# Patient Record
Sex: MALE | NOT HISPANIC OR LATINO | Employment: FULL TIME | URBAN - METROPOLITAN AREA
[De-identification: names, ages, dates, MRNs, and addresses within clinical notes are randomized per-mention and may not be internally consistent; named-entity substitution may affect disease eponyms.]

---

## 2018-11-06 ENCOUNTER — OFFICE VISIT CONVERTED (OUTPATIENT)
Dept: FAMILY MEDICINE CLINIC | Facility: CLINIC | Age: 53
End: 2018-11-06
Attending: FAMILY MEDICINE

## 2019-02-22 ENCOUNTER — OFFICE VISIT CONVERTED (OUTPATIENT)
Dept: FAMILY MEDICINE CLINIC | Facility: CLINIC | Age: 54
End: 2019-02-22
Attending: NURSE PRACTITIONER

## 2019-02-22 ENCOUNTER — CONVERSION ENCOUNTER (OUTPATIENT)
Dept: FAMILY MEDICINE CLINIC | Facility: CLINIC | Age: 54
End: 2019-02-22

## 2020-01-23 ENCOUNTER — OFFICE VISIT CONVERTED (OUTPATIENT)
Dept: FAMILY MEDICINE CLINIC | Facility: CLINIC | Age: 55
End: 2020-01-23
Attending: FAMILY MEDICINE

## 2020-01-24 ENCOUNTER — HOSPITAL ENCOUNTER (OUTPATIENT)
Dept: FAMILY MEDICINE CLINIC | Facility: CLINIC | Age: 55
Discharge: HOME | End: 2020-01-24
Attending: FAMILY MEDICINE

## 2020-01-24 LAB
ALBUMIN SERPL-MCNC: 4.2 G/DL (ref 3.5–5)
ALBUMIN/GLOB SERPL: 1.4 {RATIO} (ref 1.4–2.6)
ALP SERPL-CCNC: 63 U/L (ref 56–119)
ALT SERPL-CCNC: 11 U/L (ref 10–40)
ANION GAP SERPL CALC-SCNC: 24 MMOL/L (ref 8–19)
AST SERPL-CCNC: 16 U/L (ref 15–50)
BASOPHILS # BLD AUTO: 0.02 10*3/UL (ref 0–0.2)
BASOPHILS NFR BLD AUTO: 0.2 % (ref 0–3)
BILIRUB SERPL-MCNC: 0.5 MG/DL (ref 0.2–1.3)
BUN SERPL-MCNC: 14 MG/DL (ref 5–25)
BUN/CREAT SERPL: 13 {RATIO} (ref 6–20)
CALCIUM SERPL-MCNC: 9.9 MG/DL (ref 8.7–10.4)
CHLORIDE SERPL-SCNC: 105 MMOL/L (ref 99–111)
CHOLEST SERPL-MCNC: 168 MG/DL (ref 107–200)
CHOLEST/HDLC SERPL: 3.4 {RATIO} (ref 3–6)
CONV ABS IMM GRAN: 0.07 10*3/UL (ref 0–0.2)
CONV CO2: 19 MMOL/L (ref 22–32)
CONV IMMATURE GRAN: 0.6 % (ref 0–1.8)
CONV TOTAL PROTEIN: 7.3 G/DL (ref 6.3–8.2)
CREAT UR-MCNC: 1.09 MG/DL (ref 0.7–1.2)
DEPRECATED RDW RBC AUTO: 47.4 FL (ref 35.1–43.9)
EOSINOPHIL # BLD AUTO: 0 % (ref 0–7)
EOSINOPHIL # BLD AUTO: 0 10*3/UL (ref 0–0.7)
ERYTHROCYTE [DISTWIDTH] IN BLOOD BY AUTOMATED COUNT: 13.3 % (ref 11.6–14.4)
FOLATE SERPL-MCNC: 12.9 NG/ML (ref 4.8–20)
GFR SERPLBLD BASED ON 1.73 SQ M-ARVRAT: >60 ML/MIN/{1.73_M2}
GLOBULIN UR ELPH-MCNC: 3.1 G/DL (ref 2–3.5)
GLUCOSE SERPL-MCNC: 135 MG/DL (ref 70–99)
HCT VFR BLD AUTO: 45 % (ref 42–52)
HDLC SERPL-MCNC: 49 MG/DL (ref 40–60)
HGB BLD-MCNC: 14.6 G/DL (ref 14–18)
LDLC SERPL CALC-MCNC: 107 MG/DL (ref 70–100)
LYMPHOCYTES # BLD AUTO: 1.56 10*3/UL (ref 1–5)
LYMPHOCYTES NFR BLD AUTO: 12.3 % (ref 20–45)
MCH RBC QN AUTO: 31.4 PG (ref 27–31)
MCHC RBC AUTO-ENTMCNC: 32.4 G/DL (ref 33–37)
MCV RBC AUTO: 96.8 FL (ref 80–96)
MONOCYTES # BLD AUTO: 0.62 10*3/UL (ref 0.2–1.2)
MONOCYTES NFR BLD AUTO: 4.9 % (ref 3–10)
NEUTROPHILS # BLD AUTO: 10.39 10*3/UL (ref 2–8)
NEUTROPHILS NFR BLD AUTO: 82 % (ref 30–85)
NRBC CBCN: 0 % (ref 0–0.7)
OSMOLALITY SERPL CALC.SUM OF ELEC: 299 MOSM/KG (ref 273–304)
PLATELET # BLD AUTO: 251 10*3/UL (ref 130–400)
PMV BLD AUTO: 10.8 FL (ref 9.4–12.4)
POTASSIUM SERPL-SCNC: 4.8 MMOL/L (ref 3.5–5.3)
PSA SERPL-MCNC: 1.4 NG/ML (ref 0–4)
RBC # BLD AUTO: 4.65 10*6/UL (ref 4.7–6.1)
SODIUM SERPL-SCNC: 143 MMOL/L (ref 135–147)
TRIGL SERPL-MCNC: 61 MG/DL (ref 40–150)
VIT B12 SERPL-MCNC: 406 PG/ML (ref 211–911)
VLDLC SERPL-MCNC: 12 MG/DL (ref 5–37)
WBC # BLD AUTO: 12.66 10*3/UL (ref 4.8–10.8)

## 2020-02-11 ENCOUNTER — OFFICE VISIT CONVERTED (OUTPATIENT)
Dept: FAMILY MEDICINE CLINIC | Facility: CLINIC | Age: 55
End: 2020-02-11
Attending: FAMILY MEDICINE

## 2020-02-11 ENCOUNTER — HOSPITAL ENCOUNTER (OUTPATIENT)
Dept: FAMILY MEDICINE CLINIC | Facility: CLINIC | Age: 55
Discharge: HOME | End: 2020-02-11
Attending: FAMILY MEDICINE

## 2020-02-11 LAB
BASOPHILS # BLD AUTO: 0.08 10*3/UL (ref 0–0.2)
BASOPHILS NFR BLD AUTO: 0.9 % (ref 0–3)
CONV ABS IMM GRAN: 0.03 10*3/UL (ref 0–0.2)
CONV IMMATURE GRAN: 0.4 % (ref 0–1.8)
DEPRECATED RDW RBC AUTO: 48.3 FL (ref 35.1–43.9)
EOSINOPHIL # BLD AUTO: 0.15 10*3/UL (ref 0–0.7)
EOSINOPHIL # BLD AUTO: 1.8 % (ref 0–7)
ERYTHROCYTE [DISTWIDTH] IN BLOOD BY AUTOMATED COUNT: 13.5 % (ref 11.6–14.4)
EST. AVERAGE GLUCOSE BLD GHB EST-MCNC: 111 MG/DL
HBA1C MFR BLD: 5.5 % (ref 3.5–5.7)
HCT VFR BLD AUTO: 42.7 % (ref 42–52)
HGB BLD-MCNC: 13.9 G/DL (ref 14–18)
LYMPHOCYTES # BLD AUTO: 2.55 10*3/UL (ref 1–5)
LYMPHOCYTES NFR BLD AUTO: 30.1 % (ref 20–45)
MCH RBC QN AUTO: 31.4 PG (ref 27–31)
MCHC RBC AUTO-ENTMCNC: 32.6 G/DL (ref 33–37)
MCV RBC AUTO: 96.4 FL (ref 80–96)
MONOCYTES # BLD AUTO: 0.68 10*3/UL (ref 0.2–1.2)
MONOCYTES NFR BLD AUTO: 8 % (ref 3–10)
NEUTROPHILS # BLD AUTO: 4.97 10*3/UL (ref 2–8)
NEUTROPHILS NFR BLD AUTO: 58.8 % (ref 30–85)
NRBC CBCN: 0 % (ref 0–0.7)
PLATELET # BLD AUTO: 230 10*3/UL (ref 130–400)
PMV BLD AUTO: 10.5 FL (ref 9.4–12.4)
RBC # BLD AUTO: 4.43 10*6/UL (ref 4.7–6.1)
WBC # BLD AUTO: 8.46 10*3/UL (ref 4.8–10.8)

## 2020-02-27 ENCOUNTER — HOSPITAL ENCOUNTER (OUTPATIENT)
Dept: OTHER | Facility: HOSPITAL | Age: 55
Discharge: HOME | End: 2020-02-27
Attending: FAMILY MEDICINE

## 2020-02-27 LAB
CREAT BLD-MCNC: 1.1 MG/DL (ref 0.6–1.4)
GFR SERPLBLD BASED ON 1.73 SQ M-ARVRAT: >60 ML/MIN/{1.73_M2}

## 2020-05-27 ENCOUNTER — CONVERSION ENCOUNTER (OUTPATIENT)
Dept: GASTROENTEROLOGY | Facility: CLINIC | Age: 55
End: 2020-05-27
Attending: INTERNAL MEDICINE

## 2020-07-12 LAB — SARS-COV-2 RNA SPEC QL NAA+PROBE: NOT DETECTED

## 2020-07-14 ENCOUNTER — HOSPITAL ENCOUNTER (OUTPATIENT)
Dept: GASTROENTEROLOGY | Facility: HOSPITAL | Age: 55
Setting detail: HOSPITAL OUTPATIENT SURGERY
Discharge: HOME | End: 2020-07-14
Attending: INTERNAL MEDICINE

## 2021-01-27 ENCOUNTER — HOSPITAL ENCOUNTER (OUTPATIENT)
Dept: FAMILY MEDICINE CLINIC | Facility: CLINIC | Age: 56
Discharge: HOME | End: 2021-01-27
Attending: FAMILY MEDICINE

## 2021-01-27 ENCOUNTER — OFFICE VISIT CONVERTED (OUTPATIENT)
Dept: FAMILY MEDICINE CLINIC | Facility: CLINIC | Age: 56
End: 2021-01-27
Attending: FAMILY MEDICINE

## 2021-02-05 ENCOUNTER — HOSPITAL ENCOUNTER (OUTPATIENT)
Dept: OTHER | Facility: HOSPITAL | Age: 56
Discharge: HOME | End: 2021-02-05
Attending: FAMILY MEDICINE

## 2021-02-12 ENCOUNTER — OFFICE VISIT CONVERTED (OUTPATIENT)
Dept: FAMILY MEDICINE CLINIC | Facility: CLINIC | Age: 56
End: 2021-02-12
Attending: FAMILY MEDICINE

## 2021-02-23 ENCOUNTER — OFFICE VISIT CONVERTED (OUTPATIENT)
Dept: CARDIOLOGY | Facility: CLINIC | Age: 56
End: 2021-02-23
Attending: SPECIALIST

## 2021-03-02 ENCOUNTER — HOSPITAL ENCOUNTER (OUTPATIENT)
Dept: CARDIOLOGY | Facility: HOSPITAL | Age: 56
Discharge: HOME | End: 2021-03-02
Attending: SPECIALIST

## 2021-03-02 ENCOUNTER — CONVERSION ENCOUNTER (OUTPATIENT)
Dept: CARDIOLOGY | Facility: CLINIC | Age: 56
End: 2021-03-02
Attending: SPECIALIST

## 2021-03-03 ENCOUNTER — HOSPITAL ENCOUNTER (OUTPATIENT)
Dept: NUCLEAR MEDICINE | Facility: HOSPITAL | Age: 56
Discharge: HOME | End: 2021-03-03
Attending: SPECIALIST

## 2021-03-19 ENCOUNTER — HOSPITAL ENCOUNTER (OUTPATIENT)
Dept: GENERAL RADIOLOGY | Facility: HOSPITAL | Age: 56
Discharge: HOME | End: 2021-03-19
Attending: SPECIALIST

## 2021-04-13 ENCOUNTER — OFFICE VISIT CONVERTED (OUTPATIENT)
Dept: CARDIOLOGY | Facility: CLINIC | Age: 56
End: 2021-04-13
Attending: SPECIALIST

## 2021-04-13 ENCOUNTER — OFFICE VISIT CONVERTED (OUTPATIENT)
Dept: CARDIOLOGY | Facility: CLINIC | Age: 56
End: 2021-04-13
Attending: INTERNAL MEDICINE

## 2021-05-05 ENCOUNTER — HOSPITAL ENCOUNTER (OUTPATIENT)
Dept: PREADMISSION TESTING | Facility: HOSPITAL | Age: 56
Discharge: HOME | End: 2021-05-05
Attending: INTERNAL MEDICINE

## 2021-05-05 LAB — SARS-COV-2 RNA SPEC QL NAA+PROBE: NOT DETECTED

## 2021-05-07 NOTE — PROGRESS NOTES
Progress Note      Patient Name: Gabriele Chau   Patient ID: [id number]   Sex: Male   YOB: 1965    Referring Provider: Carlo Hernandez MD    Visit Date: January 23, 2020    Provider: Carlo Hernandez MD   Location: [facility]   Location Address: [address] [place] [address] [place]  [address]   Location Phone: [phone]          Chief Complaint     Left ear pain and headache x 5 days       History Of Present Illness  Gabriele Chau is a 54 year old /White male who presents for evaluation and treatment of:      pt urged to quit smoking- pt smokes 1 ppd x 22 yrs  pt urged to get colonoscopy  cold symptoms x 5 days- sudden onset- worsening symptoms  HTN- uncontrolled  depression- uncontrolled- no SI or HI- pt off Lexapro- restarted on Lexapro  pt has had numbness in left face only, no other neuro symptoms, no asymmetry of face or facial or eyelid droop       Past Medical History  Disease Name Date Onset Notes   Arthritis --  --    Hypertension, Benign Essential --  --    Seasonal allergies --  --          Past Surgical History  Procedure Name Date Notes   Shoulder surgery --  --          Medication List  Name Date Started Instructions   Lexapro 10 mg oral tablet 01/23/2020 TAKE ONE TABLET BY MOUTH DAILY   lisinopril 20 mg oral tablet 01/23/2020 take 1 tablet (20 mg) by oral route once daily for 30 days         Allergy List  Allergen Name Date Reaction Notes   NO KNOWN DRUG ALLERGIES --  --  --          Family Medical History  Disease Name Relative/Age Notes   Hypertension Father/   Father   Cerebrovascular Accident (CVA) Father/   Father         Social History  Finding Status Start/Stop Quantity Notes   Alcohol Former --/-- --  drank alcohol in the past   Recreational Drug Use Never --/-- --  never used   Second hand smoke exposure Unknown --/-- --  no   Tobacco Current every day --/-- 0.5 PPD current every day smoker, smokes less than 1 pack per day, for 30  "years, in the past used other tobacco products   Uses seatbelts --  --/-- --  yes         Review of Systems  · Constitutional  o Denies  o : fatigue, fever  · HENT  o Admits  o : ear pain  o Denies  o : nasal congestion, nasal discharge, sore throat  · Cardiovascular  o Denies  o : chest pain, palpitations  · Respiratory  o Denies  o : shortness of breath, cough  · Gastrointestinal  o Denies  o : nausea, vomiting, diarrhea  · Psychiatric  o Admits  o : depression  o Denies  o : anxiety, suicidal ideation, homicidal ideation      Vitals  Date Time BP Position Site L\R Cuff Size HR RR TEMP (F) WT  HT  BMI kg/m2 BSA m2 O2 Sat HC       01/23/2020 10:57 /90 Sitting    65 - R  99.1 138lbs 0oz 5'  10.5\" 19.52 1.76 100 %          Physical Examination  · Constitutional  o Appearance  o : well developed, well-nourished, in no acute distress  · Head and Face  o HEENT  o : left TM red, cloudy, left side of face slightly swollen, no redness or warmth, MMM, erythema of throat, uvula midline, throat open, no abscesses seen  · Respiratory  o Respiratory Effort  o : breathing unlabored  o Auscultation of Lungs  o : clear to ascultation  · Cardiovascular  o Heart  o :   § Auscultation of Heart  § : regular rate and rhythm  o Peripheral Vascular System  o :   § Extremities  § : no edema  · Gastrointestinal  o Abdomen  o : soft, non-tender, non-distended, + bowel sounds, no hepatosplenomegaly, no masses palpated  · Musculoskeletal  o General  o :   § General Musculoskeletal  § : No joint swelling or deformity., Muscle tone, strength, and development grossly normal.  · Neurologic  o Gait and Station  o :   § Gait Screening  § : normal gait, neuro exam is normal, face symmetrical, muscle strength 5/5, neuro face normal  · Psychiatric  o Mood and Affect  o : mood normal, affect appropriate          Assessment  · Essential hypertension     401.9/I10  · Screening for colon cancer     V76.51/Z12.11  · Screening PSA (prostate specific " antigen)     V76.44/Z12.5  · Numbness     782.0/R20.0  · Otitis media, left     382.9/H66.92    Problems Reconciled  Plan  · Orders  o CBC with Auto Diff [facility] (89970) - V76.51/Z12.11, 401.9/I10, V76.44/Z12.5 - 01/23/2020  o CMP [facility] (81769) - V76.51/Z12.11, 401.9/I10, V76.44/Z12.5 - 01/23/2020  o Lipid Panel [facility] (89685) - V76.51/Z12.11, 401.9/I10, V76.44/Z12.5 - 01/23/2020  o ACO-13: Fall Risk Screening with no falls in past year or only one fall without injury in the past year (1101F) - - 01/23/2020  o ACO-14: Influenza immunization administered or previously received () - - 01/23/2020  o ACO-17: Screened for tobacco use AND received tobacco cessation intervention (4004F) - - 01/23/2020  o ACO-39: Current medications updated and reviewed () - - 01/23/2020  o Gastroenterology Consultation (GASTR) - V76.51/Z12.11 - 01/23/2020   needs screening colonoscopy- no change in stool  o PSA Ultrasensitive, ANNUAL SCREENING [facility] (07783) - V76.51/Z12.11, 401.9/I10, V76.44/Z12.5 - 01/23/2020  o Fluzone Quadrivalent Vaccine, age 3+ (44136) - - 01/23/2020  o Vitamin B12 and folate measurement (37753, 19575) - 782.0/R20.0 - 01/23/2020  · Medications  o cefdinir 300 mg oral capsule   SIG: take 1 capsule (300 mg) by oral route every 12 hours for 7 days   DISP: (14) capsules with 0 refills  Prescribed on 01/23/2020     o Probiotic 20 billion cell oral capsule   SIG: take 1 capsule by oral route 2 times a day for 10 days   DISP: (20) capsules with 0 refills  Prescribed on 01/23/2020     o prednisone 20 mg oral tablet   SIG: take 2 tablets by oral route daily for 5 days   DISP: (10) tablets with 0 refills  Prescribed on 01/23/2020     o Ciprodex 0.3-0.1 % otic (ear) drops,suspension   SIG: instill 4 drops into left ear by otic route every 12 hours for 7 days   DISP: (1) 7.5 ml drop btl with 0 refills  Prescribed on 01/23/2020     o Lexapro 10 mg oral tablet   SIG: TAKE ONE TABLET BY MOUTH DAILY   DISP: (30) Tablet with 5  refills  Adjusted on 01/23/2020     o lisinopril 20 mg oral tablet   SIG: take 1 tablet (20 mg) by oral route once daily for 30 days   DISP: (30) tablets with 5 refills  Adjusted on 01/23/2020     o Medications have been Reconciled  o Transition of Care or Provider Policy  · Instructions  o Handouts were given to patient: flu.  o Patient was educated/instructed on their diagnosis, treatment and medications prior to discharge from the clinic today.  o Electronically Identified Patient Education Materials Provided Electronically            Electronically Signed by: Carlo Hernandez MD -Author on January 23, 2020 01:26:10 PM

## 2021-05-07 NOTE — PROGRESS NOTES
Progress Note      Patient Name: Gabriele Chau   Patient ID: [id number]   Sex: Male   YOB: 1965    Referring Provider: Ольга Oliveira DO    Visit Date: January 27, 2021    Provider: Ольга Oliveira DO   Location: [facility]   Location Address: [address] [place] [address]   [place]  [address]   Location Phone: [phone]          Chief Complaint     Pain in right hand from tree branch in OCT. Left elbow sore. No known injury. C/o shortness of breath and left chest pain for several months.       History Of Present Illness  Gabriele Chau is a 55 year old /White male who presents for evaluation and treatment of:      1.) RIGHT HAND AND THUMB PAIN : Per patient - trauma to hand via a tree branch in 10/2020. He reports intractable pain of his thumb and thenar eminence. Reports discomfort with extension and adduction of his thumb. Reports difficulty with 'picking things up.'    2.) LEFT ELBOW PAIN : Reports pain of the radial aspect of his left elbow. Per patient - 'I feel a bump.' No injury.     3.) CHEST PAIN : C/o SOA associated with exertion. Onset was several months ago. Reports left sided chest pain. Both sxs are intermittent. He denies associated episodes palpitations, diaphoresis, lightheadedness or nausea.       Past Medical History  Disease Name Date Onset Notes   Arthritis --  --    Hypertension, Benign Essential --  --    Seasonal allergies --  --          Past Surgical History  Procedure Name Date Notes   Shoulder surgery --  --          Medication List  Name Date Started Instructions   amitriptyline 10 mg oral tablet 02/11/2020 take 1 tablet (10 mg) by oral route once daily at bedtime for 30 days   Lexapro 10 mg oral tablet 01/23/2020 TAKE ONE TABLET BY MOUTH DAILY   lisinopril 20 mg oral tablet 01/23/2020 take 1 tablet (20 mg) by oral route once daily for 30 days         Allergy List  Allergen Name Date Reaction Notes   NO KNOWN DRUG ALLERGIES --  --  --           Family Medical History  Disease Name Relative/Age Notes   Hypertension Father/   Father   Cerebrovascular Accident (CVA) Father/   Father         Social History  Finding Status Start/Stop Quantity Notes   Alcohol Former --/-- --  drank alcohol in the past   Recreational Drug Use Never --/-- --  never used   Second hand smoke exposure Unknown --/-- --  no   Tobacco Current every day --/-- 0.5 PPD current every day smoker, smokes less than 1 pack per day, for 30 years, in the past used other tobacco products   Uses seatbelts --  --/-- --  yes         Immunizations  NameDate Admin Mfg Trade Name Lot Number Route Inj VIS Given VIS Publication   Mlzmnqrdu51/23/2020 [facility] FLUARIX [id number] IM  01/23/2020    Comments: NDC [id number]         Review of Systems  · Constitutional  o Denies  o : fatigue, night sweats  · Eyes  o Denies  o : double vision, blurred vision  · HENT  o Denies  o : vertigo, recent head injury  · Cardiovascular  o Admits  o : chest pain  o Denies  o : irregular heart beats  · Respiratory  o Admits  o : shortness of breath  o Denies  o : productive cough  · Gastrointestinal  o Denies  o : nausea, vomiting  · Genitourinary  o Denies  o : dysuria, urinary retention  · Integument  o Denies  o : hair growth change, new skin lesions  · Neurologic  o Denies  o : altered mental status, seizures  · Musculoskeletal  o Admits  o : right thumb and hand pain, left elbow pain  · Endocrine  o Denies  o : cold intolerance, heat intolerance  · Psychiatric  o Denies  o : hallucinations, delusions  · Heme-Lymph  o Denies  o : petechiae, lymph node enlargement or tenderness  · Allergic-Immunologic  o Denies  o : frequent illnesses      Vitals  Date Time BP Position Site L\R Cuff Size HR RR TEMP (F) WT  HT  BMI kg/m2 BSA m2 O2 Sat FR L/min FiO2        01/27/2021 03:37 /80 Sitting    75 - R  96.4 135lbs 0oz    96 %            Physical Examination  · Constitutional  o Appearance  o : alert, in no acute  distress  · Eyes  o Conjunctivae  o : conjunctivae normal  · Neck  o Inspection/Palpation  o : supple  · Respiratory  o Respiratory Effort  o : breathing unlabored  o Auscultation of Lungs  o : clear to ascultation  · Cardiovascular  o Heart  o :   § Auscultation of Heart  § : regular rate and rhythm  o Peripheral Vascular System  o :   § Extremities  § : EXAMINATION OF LEFT ELBOW - tenderness @ radial aspect of joint. EXAMINATION OF RIGHT HAND - tenderness with palpation of thenar eminence, discomfort with active ROM of thumb.  · Lymphatic  o Neck  o : no lymphadenopathy present  · Skin and Subcutaneous Tissue  o General Inspection  o : no rashes present  · Neurologic  o Gait and Station  o :   § Gait Screening  § : normal gait  · Psychiatric  o Mood and Affect  o : mood normal, affect appropriate          Assessment  · Chest pain     786.50/R07.9    A.) X-ray here in clinic - no cardiomegaly appreciated. No acute process appreciated w/ review of EKG. Will refer to cardiology for an evaluation and recommendation. Advised to either log blood pressure BID x 1 week and return for review or return to clinic for repeat blood pressure. Additional recommendations per review of log. Prescribed Lisinopril.     · Thumb pain, right     729.5/M79.644    A.) Imaging as noted - will await official radiology report. Will place in a brace w/ thumb spica at this time.     · Wrist pain, right     719.43/M25.531    A.) See above.     · Elbow pain, left     719.42/M25.522    A.) Will await x-ray report.     · Dyspnea     786.09/R06.00    A.) See above.        Plan  · Orders  o EKG (Recording and Interpretation) [facility] (Done and read at [facility]) (75088) - 786.50/R07.9, 786.09/R06.00 - 01/27/2021  o ACO-39: Current medications updated and reviewed (1159F, ) - - 01/27/2021  o Hand (Right) 3 or more views X-Ray [facility] ([id number]-QM) - 729.5/M79.644, 719.43/M25.531 - 01/27/2021   Trauma to thenar eminence several months ago -  intractable pain.   o Elbow (Left) 3 views X-Ray [facility] ([id number]-LX) - 719.42/M25.522 - 01/27/2021   Intractable pain of radial aspect of proximal left elbow.  o Chest 2 views (AP PA and Lateral) [facility] (04648) - 786.50/R07.9, 786.09/R06.00 - 01/27/2021   Smoker, ? history of 'spot on lung' per patient.  o CARDIOLOGY CONSULTATION (CARDI) - 786.50/R07.9, 786.09/R06.00 - 01/27/2021   Please scheduled for ASAP. Please send most recent EKG and CXR.   · Medications  o Medications have been Reconciled  o Transition of Care or Provider Policy  · Instructions  o Patient was educated/instructed on their diagnosis, treatment and medications prior to discharge from the clinic today.            Electronically Signed by: Ольга Oliveira DO - on January 28, 2021 12:19:37 PM

## 2021-05-07 NOTE — PROGRESS NOTES
Progress Note      Patient Name: Gabriele Chau   Patient ID: [id number]   Sex: Male   YOB: 1965    Referring Provider: Carlo Hernandez MD    Visit Date: February 22, 2019    Provider: RAZIA Olea   Location: [facility]   Location Address: [address] [place] [address]  [id number]   Location Phone: [phone]          Chief Complaint     fever, chills, body aches, and headache, x 3 days       History Of Present Illness  Gabriele Chau is a 53 year old /White male who presents for evaluation and treatment of:      fever, chills, body aches, headache x 3 days - woke up the past 3 nights with sweats    took a headache medication with fever reducer (Ibuprofen)            Past Medical History  Disease Name Date Onset Notes   Arthritis --  --    Hypertension, Benign Essential --  --    Seasonal allergies --  --          Past Surgical History  Procedure Name Date Notes   Shoulder surgery --  --          Medication List  Name Date Started Instructions   Lexapro 10 mg oral tablet 11/07/2018 take 1 tablet (10 mg) by oral route once daily for 30 days   lisinopril 10 mg oral tablet 11/07/2018 take 1 tablet (10 mg) by oral route once daily for 30 days         Allergy List  Allergen Name Date Reaction Notes   NO KNOWN DRUG ALLERGIES --  --  --          Family Medical History  Disease Name Relative/Age Notes   Cerebrovascular Accident (CVA) / Father    Father/    Hypertension / Father    Father/          Social History  Finding Status Start/Stop Quantity Notes   Alcohol Former --/-- --  drank alcohol in the past   Recreational Drug Use Never --/-- --  never used   Second hand smoke exposure Unknown --/-- --  no   Tobacco Current every day --/-- 0.5 PPD current every day smoker, smokes less than 1 pack per day, for 30 years, in the past used other tobacco products   Uses seatbelts --  --/-- --  yes         Review of Systems  · Constitutional  o Admits  o :  "fever, headache, chills, night sweats, body aches  · HENT  o Denies  o : nasal congestion, nasal discharge, sore throat  · Respiratory  o Denies  o : cough  · Gastrointestinal  o Denies  o : nausea, vomiting, diarrhea      Vitals  Date Time BP Position Site L\R Cuff Size HR RR TEMP(F) WT  HT  BMI kg/m2 BSA m2 O2 Sat HC       02/22/2019 01:03 /90 Sitting    96 - R  99.5 132lbs 8oz 5'  11\" 18.48 1.74 99 %           Physical Examination  · Constitutional  o Appearance  o : well developed, well-nourished, in no acute distress  · Eyes  o Conjunctivae  o : conjunctivae normal  o Pupils and Irises  o : pupils equal and round, pupils reactive to light bilaterally  · Ears, Nose, Mouth and Throat  o Ears  o :   § External Ears  § : no auricle tenderness to palpation present  § Otoscopic Examination  § : tympanic membrane appearance within normal limits bilaterally, no tympanic membrane lesions present  § Hearing  § : response to sound normal, no tinnitus  o Throat  o :   § Oropharynx  § : generalized hyperemia noted   · Neck  o Inspection/Palpation  o : supple  o Thyroid  o : no thyromegaly  · Respiratory  o Respiratory Effort  o : breathing unlabored  o Auscultation of Lungs  o : clear to ascultation  · Cardiovascular  o Heart  o :   § Auscultation of Heart  § : regular rate and rhythm  o Peripheral Vascular System  o :   § Extremities  § : no edema  · Lymphatic  o Neck  o : no lymphadenopathy present  · Musculoskeletal  o General  o :   § General Musculoskeletal  § : No joint swelling or deformity. Muscle tone, strength, and development grossly normal.  · Skin and Subcutaneous Tissue  o General Inspection  o : NL tone  · Neurologic  o Gait and Station  o :   § Gait Screening  § : normal gait  · Psychiatric  o Mood and Affect  o : mood normal, affect appropriate              Assessment  · Fever     780.60/R50.9  · Influenza A     487.1/J10.1      Plan  · Orders  o ACO-17: Screened for tobacco use AND received tobacco " cessation intervention (4004F) - - 02/22/2019  o ACO-39: Current medications updated and reviewed () - - 02/22/2019  o IOP - Influenza A/B Test (22771) - - 02/22/2019   Flu A-Positive Flu B-Negative  · Instructions  o Rest. Increase Fluids.  o Patient was educated/instructed on their diagnosis, treatment and medications prior to discharge from the clinic today.  o Take Tylenol and Ibuprofen alternating every 4 hours for fever and body aches - work note to cover until Monday.  · Disposition  o Follow up as needed.            Electronically Signed by: RAZIA Olea -Author on February 22, 2019 01:45:41 PM

## 2021-05-07 NOTE — PROGRESS NOTES
Progress Note      Patient Name: Gabriele Chau   Patient ID: [id number]   Sex: Male   YOB: 1965    Referring Provider: Carlo Hernandez MD    Visit Date: February 11, 2020    Provider: Carlo Hernandez MD   Location: [facility]   Location Address: [address] [place] [address] [place]  [id number]   Location Phone: [phone]          Chief Complaint     FOLLOW UP ON LABS   HEADACHE FOR 9 DAYS    A PINCHING FEELING AT LEFT BREAST FOR 2 WEEKS, WORSENED LAST 4 DAYS       History Of Present Illness  Gabriele Chau is a 54 year old /White male who presents for evaluation and treatment of:      discussed labs  leukocytosis  elevated blood glucose    pt has had intermittent headache x 9 days- left ear had been hurting pt and that is better- no modifying factors, left frontal headaches- alleve helps headaches- pain is ache that is 3/10 currently    pt gets pinching feeling in left chest that lasts a couple seconds and goes away- spot is at 1o'clock left breast       Past Medical History  Disease Name Date Onset Notes   Arthritis --  --    Hypertension, Benign Essential --  --    Seasonal allergies --  --          Past Surgical History  Procedure Name Date Notes   Shoulder surgery --  --          Medication List  Name Date Started Instructions   Lexapro 10 mg oral tablet 01/23/2020 TAKE ONE TABLET BY MOUTH DAILY   lisinopril 20 mg oral tablet 01/23/2020 take 1 tablet (20 mg) by oral route once daily for 30 days         Allergy List  Allergen Name Date Reaction Notes   NO KNOWN DRUG ALLERGIES --  --  --          Family Medical History  Disease Name Relative/Age Notes   Hypertension Father/   Father   Cerebrovascular Accident (CVA) Father/   Father         Social History  Finding Status Start/Stop Quantity Notes   Alcohol Former --/-- --  drank alcohol in the past   Recreational Drug Use Never --/-- --  never used   Second hand smoke exposure Unknown --/-- --  no  "  Tobacco Current every day --/-- 0.5 PPD current every day smoker, smokes less than 1 pack per day, for 30 years, in the past used other tobacco products   Uses seatbelts --  --/-- --  yes         Immunizations  NameDate Admin Mfg Trade Name Lot Number Route Inj VIS Given VIS Publication   Tfbrwumou12/23/2020 [facility] FLUARIX [id number] IM  01/23/2020    Comments: NDC [id number]         Review of Systems  · Constitutional  o Denies  o : fatigue, fever  · Breasts  o Admits  o : tenderness  o Denies  o : lumps, swelling, nipple discharge, abnormal changes in breast size, additional breast symptoms except as noted in the HPI  · Cardiovascular  o Denies  o : chest pain, palpitations  · Respiratory  o Denies  o : shortness of breath, cough  · Gastrointestinal  o Denies  o : nausea, vomiting, diarrhea      Vitals  Date Time BP Position Site L\R Cuff Size HR RR TEMP (F) WT  HT  BMI kg/m2 BSA m2 O2 Sat        02/11/2020 04:33 /80 Sitting    77 - R  97.8 139lbs 2oz 5'  10.5\" 19.68 1.77 98 %          Physical Examination  · Constitutional  o Appearance  o : well developed, well-nourished, in no acute distress  · Head and Face  o HEENT  o : Unremarkable  · Eyes  o Conjunctivae  o : conjunctivae normal  o Pupils and Irises  o : pupils equal and round, pupils reactive to light bilaterally, EOMI bilaterally  · Respiratory  o Respiratory Effort  o : breathing unlabored  o Auscultation of Lungs  o : clear to ascultation  · Cardiovascular  o Heart  o :   § Auscultation of Heart  § : regular rate and rhythm  o Peripheral Vascular System  o :   § Extremities  § : no edema  · Breasts  o Inspection of Breasts  o : developmental state normal for age, breasts symmetrical, no skin changes, no discharge present, no deformities present, left breast tenderness at 1 o'clock only, no masses palpated, no redness, warmth, or swelling, no axillary lymphadenopathy.  · Musculoskeletal  o General  o :   § General Musculoskeletal  § : No joint " swelling or deformity., Muscle tone, strength, and development grossly normal.  · Neurologic  o Gait and Station  o :   § Gait Screening  § : normal gait  · Psychiatric  o Mood and Affect  o : mood normal, affect appropriate          Assessment  · Headache     784.0/R51  · Elevated random blood glucose level     790.29/R73.09  · Leukocytosis     288.60/D72.829  · Chest wall pain     786.52/R07.89      Plan  · Orders  o CBC with Auto Diff [facility] (47520) - 790.29/R73.09, 288.60/D72.829 - 02/11/2020  o Hgb A1c [facility] (61689) - 790.29/R73.09, 288.60/D72.829 - 02/11/2020  o ACO-14: Influenza immunization administered or previously received () - - 02/11/2020  o ACO-39: Current medications updated and reviewed () - - 02/11/2020  o MRI brain multi-sequence wo then w contrast (87461) - 784.0/R51 - 02/11/2020  o Mammogram diagnostic 2D breast bilateral (w/US if needed) [facility]. (, 82748) - 786.52/R07.89 - 02/11/2020  · Medications  o amitriptyline 10 mg oral tablet   SIG: take 1 tablet (10 mg) by oral route once daily at bedtime for 30 days   DISP: (30) tablets with 1 refills  Prescribed on 02/11/2020     o Medications have been Reconciled  o Transition of Care or Provider Policy  · Instructions  o Patient was educated/instructed on their diagnosis, treatment and medications prior to discharge from the clinic today.            Electronically Signed by: Carlo Hernandez MD -Author on February 11, 2020 04:59:10 PM

## 2021-05-07 NOTE — PROGRESS NOTES
Progress Note      Patient Name: Gabriele Chau   Patient ID: [id number]   Sex: Male   YOB: 1965        Visit Date: November 6, 2018    Provider: Carlo Hernandez MD   Location: [facility]   Location Address: [address] [place] [address]  [id number]   Location Phone: [phone]          Chief Complaint     has not felt good for week and a half, wants to discuss prostate, and family HX.       History Of Present Illness  Gabriele Chau is a 52 year old /White male who presents for evaluation and treatment of:      pt has mole that is enlarging x 2 yrs- left inguinal area    pt has had symptoms x 1.5 weeks- has had fatigue, having intermittent chest pain left side of chest- lasts a few seconds and goes away    pt urged to quit smoking  pt needs PSA  will check rectal on next visit  pt has not had any change in stools, needs colonoscopy  HTN- not controlled  pt has  wife recently- under a lot of stress  pt takes baby aspirin daily x several years  pt has h/o chf- last saw cardiology 2011    EKG:nl  xrays:NAD       Past Medical History  Disease Name Date Onset Notes   Arthritis --  --    Hypertension, Benign Essential --  --    Seasonal allergies --  --          Past Surgical History  Procedure Name Date Notes   Shoulder surgery --  --          Allergy List  Allergen Name Date Reaction Notes   NO KNOWN DRUG ALLERGIES --  --  --          Family Medical History  Disease Name Relative/Age Notes   Cerebrovascular Accident (CVA) / Father    Father/    Hypertension / Father    Father/          Social History  Finding Status Start/Stop Quantity Notes   Alcohol Former --/-- --  drank alcohol in the past   Recreational Drug Use Never --/-- --  never used   Second hand smoke exposure Unknown --/-- --  no   Tobacco Current every day --/-- 0.5 PPD current every day smoker, smokes less than 1 pack per day, for 30 years, in the past used other tobacco products  "  Uses seatbelts --  --/-- --  yes         Review of Systems  · Constitutional  o Admits  o : fatigue  o Denies  o : fever  · Cardiovascular  o Admits  o : chest pain  o Denies  o : syncope, palpitations  · Respiratory  o Denies  o : shortness of breath, cough  · Gastrointestinal  o Denies  o : nausea, vomiting, diarrhea, abdominal pain  · Integument  o Admits  o : new skin lesions  o Denies  o : rash  · Psychiatric  o Admits  o : anxiety, depression  o Denies  o : suicidal ideation      Vitals  Date Time BP Position Site L\R Cuff Size HR RR TEMP(F) WT  HT  BMI kg/m2 BSA m2 O2 Sat HC       11/06/2018 06:08 /90 Sitting    72 - R  97.9 138lbs 6oz 5'  11\" 19.3 1.77 98 %           Physical Examination  · Constitutional  o Appearance  o : well developed, well-nourished, in no acute distress  · Respiratory  o Respiratory Effort  o : breathing unlabored  o Auscultation of Lungs  o : clear to ascultation  · Cardiovascular  o Heart  o :   § Auscultation of Heart  § : regular rate and rhythm  o Peripheral Vascular System  o :   § Extremities  § : no edema  · Musculoskeletal  o General  o :   § General Musculoskeletal  § : No joint swelling or deformity., Muscle tone, strength, and development grossly normal.  · Skin and Subcutaneous Tissue  o General Inspection  o : 1cm irregular pigmented skin lesion left inguinal area  · Neurologic  o Gait and Station  o :   § Gait Screening  § : normal gait  · Psychiatric  o Mood and Affect  o : mood normal, affect appropriate          Assessment  · Chest pain     786.50/R07.9  · Essential hypertension     401.9/I10  · Fatigue     780.79/R53.83  · Anxiety and depression       Anxiety disorder, unspecified     300.00/F41.9  Major depressive disorder, single episode, unspecified     300.00/F32.9  · Skin lesion     709.9/L98.9  · Screening for colon cancer     V76.51/Z12.11  · Screening PSA (prostate specific antigen)     V76.44/Z12.5      Plan  · Orders  o CBC with Auto Diff H " (78461) - 786.50/R07.9 - 11/06/2018  o CMP [facility] (70541) - 786.50/R07.9 - 11/06/2018  o Lipid Panel [facility] (23668) - 786.50/R07.9 - 11/06/2018  o TSH [facility] (40100) - 401.9/I10 - 11/06/2018  o ACO-14: Influenza immunization was not administered for reasons documented () - - 11/06/2018  o ACO-39: Current medications updated and reviewed () - - 11/06/2018  o DERMATOLOGY CONSULTATION (DERMA) - 709.9/L98.9 - 11/06/2018  o CARDIOLOGY CONSULTATION (CARDI) - 786.50/R07.9 - 11/06/2018  o Gastroenterology Consultation (GASTR) - V76.51/Z12.11 - 11/06/2018  o PSA Ultrasensitive, ANNUAL SCREENING [facility] (57031) - V76.44/Z12.5 - 11/06/2018  o Troponin (15279) - 786.50/R07.9 - 11/06/2018  o EKG (33076) - 786.50/R07.9 - 11/06/2018  o Xray chest 2 views [facility] (41418) - 786.50/R07.9 - 11/06/2018  o Vitamin B12 and folate measurement (15268, 30772) - 780.79/R53.83 - 11/06/2018  o Iron + transferrin (TIBC, calculated % saturation) (61136) - 780.79/R53.83 - 11/06/2018  · Medications  o lisinopril 10 mg oral tablet   SIG: take 1 tablet (10 mg) by oral route once daily for 30 days   DISP: (30) tablets with 1 refills  Prescribed on 11/06/2018     o Lexapro 10 mg oral tablet   SIG: take 1 tablet (10 mg) by oral route once daily for 30 days   DISP: (30) tablets with 1 refills  Prescribed on 11/06/2018     · Instructions  o Patient was educated/instructed on their diagnosis, treatment and medications prior to discharge from the clinic today.            Electronically Signed by: Carlo Hernandez MD -Author on November 6, 2018 06:53:37 PM

## 2021-05-07 NOTE — PROGRESS NOTES
Progress Note      Patient Name: Gabriele Chau   Patient ID: [id number]   Sex: Male   YOB: 1965    Referring Provider: Carlo Hernandez MD    Visit Date: February 12, 2021    Provider: Ольга Oliveira DO   Location: [facility]   Location Address: [address] [place] [address] [place]  [id number]   Location Phone: [phone]          Chief Complaint     Follow up to MRI of hand       History Of Present Illness  Gabriele Chau is a 55 year old /White male who presents for evaluation and treatment of:      1.) RIGHT THUMB PAIN : Patient presents for follow up regarding right thumb pain. Eventual MRI revealed osteoarthritis. During his most recent visit, the patient was placed in a brace. He reports no significant relief of his sxs. He reports that he continues to experience pain.       Past Medical History  Disease Name Date Onset Notes   Arthritis --  --    Hypertension, Benign Essential --  --    Seasonal allergies --  --          Past Surgical History  Procedure Name Date Notes   Shoulder surgery --  --          Allergy List  Allergen Name Date Reaction Notes   NO KNOWN DRUG ALLERGIES --  --  --        Allergies Reconciled  Family Medical History  Disease Name Relative/Age Notes   Hypertension Father/   Father   Cerebrovascular Accident (CVA) Father/   Father         Social History  Finding Status Start/Stop Quantity Notes   Alcohol Former --/-- --  drank alcohol in the past   Recreational Drug Use Never --/-- --  never used   Second hand smoke exposure Unknown --/-- --  no   Tobacco Current every day --/-- 0.5 PPD current every day smoker, smokes less than 1 pack per day, for 30 years, in the past used other tobacco products   Uses seatbelts --  --/-- --  yes         Immunizations  NameDate Admin Mfg Trade Name Lot Number Route Inj VIS Given VIS Publication   Dyvwsform39/23/2020 PMC FLUARIX [id number] IM  01/23/2020    Comments: NDC [id number]         Review of  "Systems  · Constitutional  o Denies  o : fever, chills  · Eyes  o Denies  o : discharge from eye, eye discomfort  · HENT  o Denies  o : lightheadedness, nasal congestion  · Cardiovascular  o Denies  o : chest pain, syncope  · Respiratory  o Denies  o : shortness of breath, wheezing  · Gastrointestinal  o Denies  o : nausea, vomiting  · Integument  o Denies  o : rash, itching  · Neurologic  o Denies  o : altered mental status, memory difficulties  · Musculoskeletal  o Admits  o : joint pain, right hand and thumb pain  · Psychiatric  o Denies  o : depression, delusions      Vitals  Date Time BP Position Site L\R Cuff Size HR RR TEMP (F) WT  HT  BMI kg/m2 BSA m2 O2 Sat FR L/min FiO2 HC       02/12/2021 03:46 /90 Sitting    66 - R  97.6 133lbs 2oz 5'  10.5\" 18.83 1.73 99 %  21%          Physical Examination  · Constitutional  o Appearance  o : alert, in no acute distress  · Eyes  o Conjunctivae  o : conjunctivae normal  · Neck  o Inspection/Palpation  o : supple  · Respiratory  o Respiratory Effort  o : breathing unlabored  · Cardiovascular  o Peripheral Vascular System  o :   § Extremities  § : no cyanosis  · Musculoskeletal  o General  o :   § General Musculoskeletal  § : edema of some joints of right hand  · Skin and Subcutaneous Tissue  o General Inspection  o : no rash appreciated   · Neurologic  o Gait and Station  o :   § Gait Screening  § : normal gait  · Psychiatric  o Mood and Affect  o : mood normal, affect appropriate          Assessment  · Right hand pain     729.5/M79.641    A.) Recommendation for referral to rheumatology - patient declined - no additional action at this time.         Plan  · Orders  o ACO-39: Current medications updated and reviewed (1159F, ) - - 02/12/2021  · Medications  o Medications have been Reconciled  o Transition of Care or Provider Policy  · Instructions  o Patient was educated/instructed on their diagnosis, treatment and medications prior to discharge from the clinic " today.  · Disposition  o Call or Return if symptoms worsen or persist.            Electronically Signed by: Ольга Oliveira DO -Author on February 12, 2021 04:02:55 PM

## 2021-05-09 VITALS
HEART RATE: 75 BPM | WEIGHT: 135 LBS | TEMPERATURE: 96.4 F | OXYGEN SATURATION: 96 % | DIASTOLIC BLOOD PRESSURE: 80 MMHG | SYSTOLIC BLOOD PRESSURE: 190 MMHG

## 2021-05-09 VITALS
WEIGHT: 138.37 LBS | SYSTOLIC BLOOD PRESSURE: 148 MMHG | HEART RATE: 72 BPM | OXYGEN SATURATION: 98 % | DIASTOLIC BLOOD PRESSURE: 90 MMHG | TEMPERATURE: 97.9 F | HEIGHT: 71 IN | BODY MASS INDEX: 19.37 KG/M2

## 2021-05-09 VITALS
TEMPERATURE: 99.5 F | HEART RATE: 96 BPM | HEIGHT: 71 IN | OXYGEN SATURATION: 99 % | BODY MASS INDEX: 18.55 KG/M2 | WEIGHT: 132.5 LBS | SYSTOLIC BLOOD PRESSURE: 154 MMHG | DIASTOLIC BLOOD PRESSURE: 90 MMHG

## 2021-05-09 VITALS
HEIGHT: 70 IN | TEMPERATURE: 99.1 F | DIASTOLIC BLOOD PRESSURE: 90 MMHG | SYSTOLIC BLOOD PRESSURE: 180 MMHG | BODY MASS INDEX: 19.76 KG/M2 | OXYGEN SATURATION: 100 % | WEIGHT: 138 LBS | HEART RATE: 65 BPM

## 2021-05-09 VITALS
HEART RATE: 77 BPM | WEIGHT: 139.12 LBS | OXYGEN SATURATION: 98 % | BODY MASS INDEX: 19.92 KG/M2 | DIASTOLIC BLOOD PRESSURE: 80 MMHG | SYSTOLIC BLOOD PRESSURE: 130 MMHG | HEIGHT: 70 IN | TEMPERATURE: 97.8 F

## 2021-05-09 VITALS
HEART RATE: 66 BPM | BODY MASS INDEX: 19.06 KG/M2 | SYSTOLIC BLOOD PRESSURE: 162 MMHG | DIASTOLIC BLOOD PRESSURE: 90 MMHG | OXYGEN SATURATION: 99 % | WEIGHT: 133.12 LBS | HEIGHT: 70 IN | TEMPERATURE: 97.6 F

## 2021-05-10 ENCOUNTER — HOSPITAL ENCOUNTER (OUTPATIENT)
Dept: INFUSION THERAPY | Facility: HOSPITAL | Age: 56
Setting detail: HOSPITAL OUTPATIENT SURGERY
Discharge: HOME | End: 2021-05-10
Attending: INTERNAL MEDICINE

## 2021-05-10 LAB
ANION GAP SERPL CALC-SCNC: 9 MMOL/L (ref 8–19)
APTT BLD: 24 S (ref 22.2–34.2)
BASOPHILS # BLD AUTO: 0.07 10*3/UL (ref 0–0.2)
BASOPHILS NFR BLD AUTO: 0.8 % (ref 0–3)
BUN SERPL-MCNC: 18 MG/DL (ref 5–25)
BUN/CREAT SERPL: 14 {RATIO} (ref 6–20)
CALCIUM SERPL-MCNC: 8.9 MG/DL (ref 8.7–10.4)
CHLORIDE SERPL-SCNC: 106 MMOL/L (ref 99–111)
CONV ABS IMM GRAN: 0.02 10*3/UL (ref 0–0.2)
CONV CO2: 29 MMOL/L (ref 22–32)
CONV IMMATURE GRAN: 0.2 % (ref 0–1.8)
CREAT UR-MCNC: 1.33 MG/DL (ref 0.7–1.2)
DEPRECATED RDW RBC AUTO: 45.1 FL (ref 35.1–43.9)
EOSINOPHIL # BLD AUTO: 0.17 10*3/UL (ref 0–0.7)
EOSINOPHIL # BLD AUTO: 1.9 % (ref 0–7)
ERYTHROCYTE [DISTWIDTH] IN BLOOD BY AUTOMATED COUNT: 13.2 % (ref 11.6–14.4)
GFR SERPLBLD BASED ON 1.73 SQ M-ARVRAT: 59 ML/MIN/{1.73_M2}
GLUCOSE SERPL-MCNC: 96 MG/DL (ref 70–99)
HCT VFR BLD AUTO: 40.2 % (ref 42–52)
HGB BLD-MCNC: 13.3 G/DL (ref 14–18)
INR PPP: 0.84 (ref 2–3)
LYMPHOCYTES # BLD AUTO: 2.37 10*3/UL (ref 1–5)
LYMPHOCYTES NFR BLD AUTO: 26.2 % (ref 20–45)
MCH RBC QN AUTO: 31 PG (ref 27–31)
MCHC RBC AUTO-ENTMCNC: 33.1 G/DL (ref 33–37)
MCV RBC AUTO: 93.7 FL (ref 80–96)
MONOCYTES # BLD AUTO: 0.71 10*3/UL (ref 0.2–1.2)
MONOCYTES NFR BLD AUTO: 7.9 % (ref 3–10)
NEUTROPHILS # BLD AUTO: 5.7 10*3/UL (ref 2–8)
NEUTROPHILS NFR BLD AUTO: 63 % (ref 30–85)
NRBC CBCN: 0 % (ref 0–0.7)
OSMOLALITY SERPL CALC.SUM OF ELEC: 292 MOSM/KG (ref 273–304)
PLATELET # BLD AUTO: 215 10*3/UL (ref 130–400)
PMV BLD AUTO: 9.4 FL (ref 9.4–12.4)
POTASSIUM SERPL-SCNC: 4.3 MMOL/L (ref 3.5–5.3)
PROTHROMBIN TIME: 9.6 S (ref 9.4–12)
RBC # BLD AUTO: 4.29 10*6/UL (ref 4.7–6.1)
SODIUM SERPL-SCNC: 140 MMOL/L (ref 135–147)
WBC # BLD AUTO: 9.04 10*3/UL (ref 4.8–10.8)

## 2021-05-10 NOTE — PROCEDURES
"   Procedure Note      Patient Name: Gabriele Chau   Patient ID: [id number]   Sex: Male   YOB: 1965    Primary Care Provider: HERMANN SANTIAGO DO   Referring Provider: HERMANN SANTIAGO DO    Visit Date: March 2, 2021    Provider: Paxton Reeves MD   Location: [facility]   Location Address: [address] [place] [address], [address]   [place]  [id number]   Location Phone: [phone]          FINAL REPORT   TRANSTHORACIC ECHOCARDIOGRAM REPORT    Diagnosis: Shortness of breath   Height: 6'0\" Weight: 133 B/P: 180/86 BSA: 1.8   Tech: BNS   MEASUREMENTS:  RVID (Diastole) : RVID. (NORMAL: 0.7 to 2.4 cm max)   LVID (Systole): 3.9 cm (Diastole): 5.5 cm . (NORMAL: 3.7 - 5.4 cm)   Posterior Wall Thickness (Diastole): 0.8 cm. (NORMAL: 0.8 - 1.1 cm)   Septal Thickness (Diastole): 0.9 cm. (NORMAL: 0.7 - 1.2 cm)   LAID (Systole): 2.9 cm. (NORMAL: 1.9 - 3.8 cm)   Aortic Root Diameter (Diastole): 3.8 cm. (NORMAL: 2.0 - 3.7 cm)   DOPPLER:  E/A ratio 1.7 (NORMAL 0.8-2.0)   DT: 176 msec (NORMAL 140-240 msec.)   IVRT 88 m/sec (NORMAL  m/sec.)   E/E': 8 (NORMAL <8 avg.)   COMMENTS:  The patient underwent 2-D, M-Mode, and Doppler examination, including pulse-wave, continuous-wave, and color-flow analysis; the study is technically adequate.   FINDINGS:  AORTIC VALVE: Normal. Tricuspid in appearance with normal central closure.   MITRAL VALVE: Normal. Bicuspid in appearance.   TRICUSPID VALVE: Normal.   PULMONIC VALVE: Not well visualized.   LEFT ATRIUM: Normal. No intracavitary masses or clots seen. LA volume index is 24 mL/m2.   AORTIC ROOT: Normal in size with adequate motion.   LEFT VENTRICLE: Normal left ventricular systolic function. Ejection fraction 60%.   RIGHT ATRIUM: Normal.   RIGHT VENTRICLE: Normal size and function.   PERICARDIUM: Unremarkable. No evidence of effusion.   INFERIOR VENA CAVA: Diameter is 2.5 cm.   DOPPLER: Doppler examination of the aortic, mitral, tricuspid, and pulmonary valves " was performed. Normal pulmonary artery systolic pressure by Doppler. There is small ASD with left-to-right shunt.   Faxed: 03/05/2021      CONCLUSION:  1.  Normal left ventricular systolic function.   2.  Small ASD noted.       MD PAULINO Gardner/pap                 Electronically Signed by: Ellen Jamil-, Other -Author on March 5, 2021 10:04:16 AM  Electronically Co-signed by: Paxton Reeves MD -Reviewer on March 15, 2021 11:55:55 AM

## 2021-05-10 NOTE — H&P
History and Physical      Patient Name: Gabriele Chau   Patient ID: [id number]   Sex: Male   YOB: 1965    Primary Care Provider: HERMANN SANTIAGO DO   Referring Provider: HERMANN SANTIAGO DO    Visit Date: February 23, 2021    Provider: Paxton Reeves MD   Location: [facility]   Location Address: [address] [place] [address], [facility]   [place]  [id number]   Location Phone: [phone]          Chief Complaint  · Chest pain.  · Shortness of breath.  · Bilateral leg pain.      History Of Present Illness  Consult requested by: HERMANN SANTIAGO DO   Gabriele Chau is a 55 year old male with a history of chest pain on and off for the last several years, substernal, aching, nonexertional, relieves spontaneously, lasts for several minutes with no aggravating or alleviating factors. He has bilateral leg pain mostly on exertion, relieved with rest. This has been going on for the last few years. He has shortness of breath on minimal exertion. No PND. No orthopnea. Cardiac risk factors: History of hypertension positive. Known smoker. No history of hyperlipidemia. No history of diabetes mellitus. Family history negative.   PAST MEDICAL HISTORY: Arthritis; Hypertension.   FAMILY HISTORY: Positive for diabetes mellitus and hypertension. Negative for heart disease.   PSYCHOSOCIAL HISTORY: Current smoker of 1 pack per day. Rarely consumes alcohol. Daily caffeine. .   CURRENT MEDICATIONS: None.   ALLERGIES: No known drug allergies.       Review of Systems  · Constitutional  o Admits  o : fatigue, good general health lately  o Denies  o : recent weight changes   · Eyes  o Admits  o : blurred vision  · HENT  o Admits  o : chronic sinus problem  o Denies  o : hearing loss or ringing, swollen glands in neck  · Cardiovascular  o Admits  o : chest pain, palpitations (fast, fluttering, or skipping beats), shortness of breath with activities   o Denies  o : swelling (feet, ankles,  hands)  · Respiratory  o Denies  o : asthma or wheezing, COPD  · Gastrointestinal  o Denies  o : ulcers, nausea or vomiting  · Neurologic  o Admits  o : headaches  o Denies  o : lightheaded or dizzy, stroke  · Musculoskeletal  o Admits  o : joint pain, back pain  · Endocrine  o Admits  o : heat or cold intolerance, excessive thirst or urination  o Denies  o : thyroid disease, diabetes  · Heme-Lymph  o Denies  o : bleeding or bruising tendency, anemia      Vitals  Date Time BP Position Site L\R Cuff Size HR RR TEMP (F) WT  HT  BMI kg/m2 BSA m2 O2 Sat FR L/min FiO2 HC       02/23/2021 09:47 /86 Sitting    64 - R   133lbs 0oz 6'   18.04 1.75       02/23/2021 09:47 /80 Sitting                       Physical Examination  · Constitutional  o Appearance  o : Awake, alert, cooperative, pleasant.  · Eyes  o Pupils and Irises  o : Pupils equal and reacting to light and accommodation.  · Ears, Nose, Mouth and Throat  o Ears  o : Tympanic membranes are normal.  o Nose  o : Clear with no maxillary tenderness.  o Oral Cavity  o : Clear.  · Neck  o Inspection/Palpation  o : No JVD, bruits, thyromegaly, or adenopathy. Trachea midline. No axillary or cervical lymphadenopathy.  o Thyroid  o : No thyroid enlargement.   · Respiratory  o Inspection of Chest  o : No chest wall deformities, moving equal.  o Auscultation of Lungs  o : Good air entry with vesicular breath sounds.  o Percussion of Chest  o : Resonant bilaterally.   o Palpation of Chest  o : Equal movements bilaterally.  · Cardiovascular  o Heart  o :   § Auscultation of Heart  § : PMI is in the 5th intercostal space. S1 and S2 regular. No S3. No S4.   o Peripheral Vascular System  o :   § Extremities  § : No pedal edema. Peripheral pulses well felt. No cyanosis.  · Gastrointestinal  o Abdominal Examination  o : No organomegaly, masses, tenderness, bruits, or abnormal pulsations. Bowel sounds are normal.  · Musculoskeletal  o General  o : Muscle strength is normal  with normal tone.  · Skin and Subcutaneous Tissue  o General Inspection  o : No rash, petechiae, or suspicious skin lesions. Skin turgor is normal.          Assessment     ASSESSMENT & PLAN:    1.  Precordial chest pain with shortness of breath and positive risk factors.  In view of his chest pain and        positive risk factors, we will do a Lexiscan stress test to rule out any significant ischemia.  He cannot walk        on a treadmill due to claudication pain.  We will also do an echocardiogram to evaluate the left ventricular        systolic function.     2.  Peripheral vascular disease with claudication pain.  We will do an arterial Doppler and an RHIANNON to evaluate        for significant peripheral vascular disease.    3.  Positive nicotine use.  Smoking cessation instructions were discussed with the patient.  4.  Essential hypertension.  Monitor blood pressure regularly.  Persistently high.  Needs to be on        antihypertensive.  Will start him on losartan 100 mg once a day.  5.  See me back in a month.                   Electronically Signed by: Xochitl Maravilla-, Other -Author on February 26, 2021 01:14:44 PM  Electronically Co-signed by: Paxton Reeves MD -Reviewer on March 15, 2021 11:58:32 AM

## 2021-05-10 NOTE — H&P
History and Physical      Patient Name: Gabriele Chau   Patient ID: [id number]   Sex: Male   YOB: 1965        Create Date: May 27, 2020              Chief Complaint  · Surgical History and Physical  · Screening Colonoscopy      History Of Present Illness  NON-INPATIENT HISTORY AND PHYSICAL  Allergies: NO KNOWN DRUG ALLERGIES   Chief Complaint/History of Present Illness: Colonoscopy Screening   Colon Recall: No   Failed Outpatient Treatment/Contraindications: N/A   Current Medications: Suprep Bowel Prep Kit 17.5-3.13-1.6 gram oral recon soln   Significant Past Medical History: High blood pressure   Significant Family Medical History: No family history of colon cancer   Significant Past Surgical History: EYE SURGERY and Rotator Cuff repair   Previous Colonoscopy: No   Previous EGD: No   PHYSICAL EXAM:  Heart: Regular Rate and Rhythm   Lungs: Breathing Unlabored           Assessment  · Preoperative examination     V72.84/Z01.818  · Screening for colon cancer     V76.51/Z12.11      Plan  · Orders  o Consent for Colonoscopy Screening -Possible risk/complications, benefits, and alternatives to surgical or invasive procedure have been explained to patient and/or legal gaurdian. -Patient has been evaluated and can tolerate anethesia and/or sedation. Risk, benefits, and alternatives to anesthesia and sedation have been explained to patient or legal gaurdian. () - V72.84/Z01.818, V76.51/Z12.11 - 07/14/2020  · Instructions  o ****Surgical Orders****  o ***************  o Outpatient  o ***************  o RISK AND BENEFITS:  o Possible risks/complications, benefits and alternatives to surgical or invasive procedure have been explained to the patient and/or legal guardian.  o Patient has been evaluated and can tolerate anesthesia and/or sedation. Risks, benefits, and alternatives to anesthesia and sedation have been explained to the patient and/or legal guardian.  o ***************  o PREP: Per  protocol  o IV: Per Anesthesia  o The above History and Physical Examination has been completed within 30 days of admission.  o This note has been transcribed by DAYLIN Bautista. I have read and agree with the findings in this note.            Electronically Signed by: Ligia Hernandez MA -Author on May 27, 2020 10:19:01 AM

## 2021-05-14 VITALS
BODY MASS INDEX: 17.88 KG/M2 | WEIGHT: 132 LBS | HEIGHT: 72 IN | SYSTOLIC BLOOD PRESSURE: 134 MMHG | DIASTOLIC BLOOD PRESSURE: 66 MMHG | HEART RATE: 52 BPM

## 2021-05-14 VITALS
SYSTOLIC BLOOD PRESSURE: 180 MMHG | HEIGHT: 72 IN | DIASTOLIC BLOOD PRESSURE: 86 MMHG | HEART RATE: 64 BPM | WEIGHT: 133 LBS | BODY MASS INDEX: 18.01 KG/M2

## 2021-05-14 NOTE — PROGRESS NOTES
Progress Note      Patient Name: Gabriele Chau   Patient ID: [id number]   Sex: Male   YOB: 1965    Primary Care Provider: HERMANN SANTIAGO DO   Referring Provider: Paxton Reeves MD    Visit Date: April 13, 2021    Provider: Trey Parks MD   Location: [facility]   Location Address: [address] [place] [address], [facility]   [place], [place]  [id number]   Location Phone: [phone]          Chief Complaint     Bilateral leg pain.       History Of Present Illness  REFERRING CARE PROVIDER: Paxton Reeves MD   Gabriele Chau is a 55 year old male who is followed by Dr. Reeves. I was asked to see him today due to peripheral arterial disease with recent abnormal ABIs and recent abnormal CTA of the abdomen/pelvis with run-offs, which revealed chronic total occlusion of the external iliac arteries bilaterally at their origins with reconstitution in the distal external iliac arteries slightly proximal to the inguinal ligaments bilaterally. No significant femoropopliteal disease was noted bilaterally, and there was at least two vessel run-off below the knees bilaterally. Mr. Chau states he has severe, limiting bilateral lower extremity claudication after walking approximately a few hundred steps. He states he can only walk across a parking lot, which is approximately 60-75 yards at work before he has to stop and rest for 10-15 minutes. He does not report any lower extremity ulceration or skin changes or any rest pain. He previously smoked for many years, but states he quit smoking approximately one week ago. He reports that he is very committed to quitting smoking now and does not plan to ever resume smoking. He states he is doing well thus far and does not request any assistance with tobacco cessation today. He does not report any history of diabetes mellitus. The patient's blood pressure was well controlled at 134/66 today.   PAST MEDICAL HISTORY: Arthritis; Hypertension.    PSYCHOSOCIAL HISTORY: Previous tobacco use, but quit. Denies alcohol use.   CURRENT MEDICATIONS: Losartan 100 mg daily.      ALLERGIES:  NO KNOWN DRUG ALLERGIES.       Review of Systems  · Cardiovascular  o Denies  o : palpitations (fast, fluttering, or skipping beats), swelling (feet, ankles, hands), shortness of breath while walking or lying flat, chest pain or angina pectoris   · Respiratory  o Denies  o : chronic or frequent cough, asthma or wheezing      Vitals  Date Time BP Position Site L\R Cuff Size HR RR TEMP (F) WT  HT  BMI kg/m2 BSA m2 O2 Sat FR L/min FiO2 HC       04/13/2021 02:39 /66 Sitting    52 - R   132lbs 0oz 6'   17.9 1.74             Physical Examination  · Constitutional  o Appearance  o : Well-developed, well-nourished, alert and oriented, in no acute distress.  · Neck  o Inspection/Palpation  o : Supple. No JVD. No carotid bruit. No masses.   · Respiratory  o Auscultation of Lungs  o : Mildly prolonged expiratory phase with a few scattered rhonchi bilaterally. No wheezing or rales. No tachypnea. Normal effort with no increased work of breathing.  · Cardiovascular  o Heart  o : Regular rate and rhythm. Normal S1 and S2. No S3 or S4 gallop. No murmur.   · Gastrointestinal  o Abdominal Examination  o : Soft, nontender, nondistended. Normoactive bowel sounds in all quadrants. No masses.   · Skin and Subcutaneous Tissue  o General Inspection  o : Warm and dry. No rashes or lesions. Normal skin turgor.   · Extremities  o Extremities  o : No cyanosis, clubbing, or edema. 2+ radial and PT pulses bilaterally. Severely diminished posterior tibial pulses bilaterally.          Assessment     1.  Peripheral arterial disease with bilateral lower extremity edema [place] class III claudication and recent        abnormal CTA demonstrating chronic total occlusion of the external iliac arteries bilaterally.    2.  Former long-standing tobacco abuse.  Mr. Chau states he quit smoking one week ago  and is        committed to permanent tobacco cessation.  3.  Hyperlipidemia.             Plan     I discussed multiple options with him, including continued medical therapy and increased walking program versus referral for aortobifemoral bypass surgery versus attempted endovascular revascularization.  We will start aspirin 81 mg daily, as well as Xarelto 2.5 mg twice daily.  We will also start high-intensity statin therapy with an LDL goal of less than 70.  He was instructed to attempt to increase his walking program substantially over the next few weeks.  I informed him that there was no point in proceeding with either surgical or endovascular revascularization if he is going to continue smoking, as the odds of long-term patency would be quite low with continued tobacco use.  He expressed understanding and states he is fully committed to not resume smoking.  I informed that aortobifemoral bypass surgery would have the greatest long-term patency and would be the most optimal revascularization for his problem.  However, he strongly wishes to avoid open surgery at this time and wants to consider endovascular repair.  I informed him that we would have to proceed with revascularization of one leg at a time, likely from a left radial and either a popliteal or pedal access approach.  I also reviewed with Mr. Chau that the chances of success in crossing his external iliac chronic occlusions are likely only 70-80%, even with multiple accesses.  He expressed understanding, but strongly wishes to proceed if his symptoms do not improve on optimal medical therapy.  We will see how he does over the next few weeks and can consider proceeding with an aortogram with run-offs and possible peripheral endovascular intervention if he is not having significant symptomatic improvement.  The risks and benefits of the potential procedure were reviewed with him in detail, and he expressed understanding.                             Electronically Signed by: Xochitl Maravilla-, Other -Author on April 20, 2021 08:45:29 AM  Electronically Co-signed by: Trey Parks MD -Reviewer on May 5, 2021 10:01:15 AM

## 2021-05-14 NOTE — PROGRESS NOTES
Progress Note      Patient Name: Gabriele Chau   Patient ID: [id number]   Sex: Male   YOB: 1965    Primary Care Provider: HERMANN SANTIAGO DO   Referring Provider: HERMANN SANTIAGO DO    Visit Date: April 13, 2021    Provider: Paxton Reeves MD   Location: [facility]   Location Address: [address] [place] [address], [address]   [place]  [id number]   Location Phone: [phone]          Chief Complaint     Bilateral leg pain.  Chest pain.  Shortness of breath.       History Of Present Illness  Gabriele Chau is a 55 year old male with a history of chest pain. He had a recent stress test, which was negative for ischemia. He has bilateral leg pain.   CURRENT MEDICATIONS: Losartan 100 mg daily.   PAST MEDICAL HISTORY: Arthritis; Hypertension.   PSYCHOSOCIAL HISTORY: Previous tobacco use, but quit. Denies alcohol use.      ALLERGIES: No known drug allergies.       Review of Systems  · Cardiovascular  o Admits  o : shortness of breath while walking or lying flat, chest pain or angina pectoris   o Denies  o : palpitations (fast, fluttering, or skipping beats), swelling (feet, ankles, hands)  · Respiratory  o Denies  o : chronic or frequent cough      Vitals  Date Time BP Position Site L\R Cuff Size HR RR TEMP (F) WT  HT  BMI kg/m2 BSA m2 O2 Sat FR L/min FiO2 HC       04/13/2021 02:39 /66 Sitting    52 - R   132lbs 0oz 6'   17.9 1.74             Physical Examination  · Constitutional  o Appearance  o : Awake, alert, cooperative, pleasant.  · Respiratory  o Inspection of Chest  o : No chest wall deformities, moving equal.  o Auscultation of Lungs  o : Good air entry with vesicular breath sounds.  · Cardiovascular  o Heart  o :   § Auscultation of Heart  § : S1 and S2 regular. No S3. No S4.   o Peripheral Vascular System  o :   § Extremities  § : Peripheral pulses were well felt. No edema. No cyanosis.  · Gastrointestinal  o Abdominal Examination  o : No masses or organomegaly  noted.          Assessment     ASSESSMENT & PLAN:    1.  Precordial chest pain.  Negative sestamibi stress test.  Discussed with patient the results of his stress test.  2.  Peripheral vascular disease.  Claudication pain bilateral external femoral artery stenosis.  We will refer him        to Dr. Parks for probable PCI of his external femoral arteries.  3.  Positive nicotine use.  He says he has quit smoking recently.  He understands the risks of smoking with        peripheral vascular interventions.             Electronically Signed by: Xochitl Maravilla-, Other -Author on April 20, 2021 06:21:49 AM  Electronically Co-signed by: Paxton Reeves MD -Reviewer on April 29, 2021 09:51:42 AM

## 2021-05-21 ENCOUNTER — OFFICE VISIT CONVERTED (OUTPATIENT)
Dept: CARDIOLOGY | Facility: CLINIC | Age: 56
End: 2021-05-21
Attending: SPECIALIST

## 2021-06-05 NOTE — PROGRESS NOTES
Progress Note      Patient Name: Gabriele Chau   Patient ID: [id number]   Sex: Male   YOB: 1965    Primary Care Provider: HERMANN SANTIAGO DO   Referring Provider: HERMANN SANTIAGO DO    Visit Date: May 21, 2021    Provider: Paxton Reeves MD   Location: [facility]   Location Address: [address] [place] [address], [address]   [place], [place]  [id number]   Location Phone: [phone]          Chief Complaint     Coronary artery disease, left main disease.  Peripheral vascular disease.       History Of Present Illness  Gabriele Chau is a 55 year old male with a history of coronary artery disease and left main disease. He is supposed to get coronary artery bypass graft with Dr. Jackson in the next few weeks. He is also supposed to get peripheral vascular surgery with Dr. Schwarz.   CURRENT MEDICATIONS: Medications reviewed and are as documented.   PAST MEDICAL HISTORY: Arthritis; Hypertension.   PSYCHOSOCIAL HISTORY: Previous tobacco use, but quit. Denies alcohol use.      ALLERGIES:  No known drug allergies.       Review of Systems  · Cardiovascular  o Admits  o : shortness of breath while walking or lying flat, chest pain or angina pectoris   o Denies  o : palpitations (fast, fluttering, or skipping beats), swelling (feet, ankles, hands)  · Respiratory  o Denies  o : chronic or frequent cough      Vitals  Date Time BP Position Site L\R Cuff Size HR RR TEMP (F) WT  HT  BMI kg/m2 BSA m2 O2 Sat FR L/min FiO2 HC       05/21/2021 12:19 /74 Sitting    60 - R   130lbs 0oz 6'   17.63 1.73             Physical Examination  · Constitutional  o Appearance  o : Awake, alert, cooperative, pleasant.  · Respiratory  o Inspection of Chest  o : No chest wall deformities, moving equal.  o Auscultation of Lungs  o : Good air entry with vesicular breath sounds.  · Cardiovascular  o Heart  o :   § Auscultation of Heart  § : S1 and S2 regular. No S3. No S4.   o Peripheral Vascular System  o :    § Extremities  § : Peripheral pulses were well felt. No edema. No cyanosis.  · Gastrointestinal  o Abdominal Examination  o : No masses or organomegaly noted.          Assessment     ASSESSMENT & PLAN:    1.  Bilateral peripheral vascular disease.  Continue aspirin for now.  Could not tolerate low-dose Xarelto.  2.  Coronary artery disease/left main disease.  He is supposed to get coronary artery bypass graft surgery.  I        advised him against him any exertion until after the coronary artery bypass graft surgery.  All risks and        benefits of coronary artery bypass graft surgery and peripheral vascular surgery have been discussed with        the patient.      3.  Hyperlipidemia.  Could not tolerate Lipitor.  Start him on Crestor 20 mg once a day.  Check a lipid profile in        3-4 months.    4.  Essential hypertension, controlled.  Continue current dose of losartan.             Electronically Signed by: Xochitl Maravilla-, Other -Author on May 24, 2021 12:51:29 PM  Electronically Co-signed by: Paxton Reeves MD -Reviewer on June 2, 2021 12:44:51 PM

## 2021-07-06 RX ORDER — RIVAROXABAN 2.5 MG/1
TABLET, FILM COATED ORAL
Qty: 60 TABLET | Refills: 4 | OUTPATIENT
Start: 2021-07-06

## 2021-07-06 RX ORDER — ATORVASTATIN CALCIUM 40 MG/1
TABLET, FILM COATED ORAL
Qty: 30 TABLET | Refills: 4 | OUTPATIENT
Start: 2021-07-06

## 2021-07-15 VITALS
SYSTOLIC BLOOD PRESSURE: 132 MMHG | BODY MASS INDEX: 17.61 KG/M2 | WEIGHT: 130 LBS | HEART RATE: 60 BPM | DIASTOLIC BLOOD PRESSURE: 74 MMHG | HEIGHT: 72 IN

## 2021-07-30 RX ORDER — LOSARTAN POTASSIUM 100 MG/1
TABLET ORAL
COMMUNITY
Start: 2021-07-06 | End: 2021-07-30 | Stop reason: SDUPTHER

## 2021-07-30 RX ORDER — ROSUVASTATIN CALCIUM 20 MG/1
20 TABLET, COATED ORAL NIGHTLY
Qty: 90 TABLET | Refills: 3 | Status: SHIPPED | OUTPATIENT
Start: 2021-07-30 | End: 2022-02-21

## 2021-07-30 RX ORDER — LOSARTAN POTASSIUM 100 MG/1
100 TABLET ORAL DAILY
Qty: 90 TABLET | Refills: 3 | Status: SHIPPED | OUTPATIENT
Start: 2021-07-30 | End: 2022-03-28

## 2021-07-30 RX ORDER — ROSUVASTATIN CALCIUM 20 MG/1
TABLET, COATED ORAL
COMMUNITY
Start: 2021-07-21 | End: 2021-07-30 | Stop reason: SDUPTHER

## 2021-08-09 RX ORDER — RIVAROXABAN 2.5 MG/1
TABLET, FILM COATED ORAL
Qty: 60 TABLET | Refills: 1 | Status: SHIPPED | OUTPATIENT
Start: 2021-08-09 | End: 2021-08-20

## 2021-08-18 ENCOUNTER — TRANSCRIBE ORDERS (OUTPATIENT)
Dept: LAB | Facility: HOSPITAL | Age: 56
End: 2021-08-18

## 2021-08-18 ENCOUNTER — TRANSCRIBE ORDERS (OUTPATIENT)
Dept: CARDIOLOGY | Facility: HOSPITAL | Age: 56
End: 2021-08-18

## 2021-08-18 ENCOUNTER — LAB (OUTPATIENT)
Dept: LAB | Facility: HOSPITAL | Age: 56
End: 2021-08-18

## 2021-08-18 ENCOUNTER — HOSPITAL ENCOUNTER (OUTPATIENT)
Dept: GENERAL RADIOLOGY | Facility: HOSPITAL | Age: 56
Discharge: HOME | End: 2021-08-18

## 2021-08-18 ENCOUNTER — TRANSCRIBE ORDERS (OUTPATIENT)
Dept: GENERAL RADIOLOGY | Facility: HOSPITAL | Age: 56
End: 2021-08-18

## 2021-08-18 ENCOUNTER — HOSPITAL ENCOUNTER (OUTPATIENT)
Dept: CARDIOLOGY | Facility: HOSPITAL | Age: 56
Discharge: HOME | End: 2021-08-18

## 2021-08-18 DIAGNOSIS — I73.9 PVD (PERIPHERAL VASCULAR DISEASE) (HCC): Primary | ICD-10-CM

## 2021-08-18 DIAGNOSIS — I73.9 PVD (PERIPHERAL VASCULAR DISEASE) (HCC): ICD-10-CM

## 2021-08-18 LAB
ALBUMIN SERPL-MCNC: 4.2 G/DL (ref 3.5–5.2)
ALBUMIN/GLOB SERPL: 1.4 G/DL
ALP SERPL-CCNC: 66 U/L (ref 39–117)
ALT SERPL W P-5'-P-CCNC: 22 U/L (ref 1–41)
ANION GAP SERPL CALCULATED.3IONS-SCNC: 7.2 MMOL/L (ref 5–15)
APTT PPP: 22.4 SECONDS (ref 22.2–34.2)
AST SERPL-CCNC: 22 U/L (ref 1–40)
BASOPHILS # BLD AUTO: 0.06 10*3/MM3 (ref 0–0.2)
BASOPHILS NFR BLD AUTO: 0.7 % (ref 0–1.5)
BILIRUB SERPL-MCNC: 0.4 MG/DL (ref 0–1.2)
BUN SERPL-MCNC: 12 MG/DL (ref 6–20)
BUN/CREAT SERPL: 8.2 (ref 7–25)
CALCIUM SPEC-SCNC: 9.6 MG/DL (ref 8.6–10.5)
CHLORIDE SERPL-SCNC: 106 MMOL/L (ref 98–107)
CO2 SERPL-SCNC: 28.8 MMOL/L (ref 22–29)
CREAT SERPL-MCNC: 1.46 MG/DL (ref 0.76–1.27)
DEPRECATED RDW RBC AUTO: 43.2 FL (ref 37–54)
EOSINOPHIL # BLD AUTO: 0.19 10*3/MM3 (ref 0–0.4)
EOSINOPHIL NFR BLD AUTO: 2.1 % (ref 0.3–6.2)
ERYTHROCYTE [DISTWIDTH] IN BLOOD BY AUTOMATED COUNT: 12.8 % (ref 12.3–15.4)
GFR SERPL CREATININE-BSD FRML MDRD: 50 ML/MIN/1.73
GFR SERPL CREATININE-BSD FRML MDRD: 60 ML/MIN/1.73
GLOBULIN UR ELPH-MCNC: 2.9 GM/DL
GLUCOSE SERPL-MCNC: 92 MG/DL (ref 65–99)
HCT VFR BLD AUTO: 38.8 % (ref 37.5–51)
HGB BLD-MCNC: 12.7 G/DL (ref 13–17.7)
IMM GRANULOCYTES # BLD AUTO: 0.02 10*3/MM3 (ref 0–0.05)
IMM GRANULOCYTES NFR BLD AUTO: 0.2 % (ref 0–0.5)
INR PPP: 0.9 (ref 2–3)
LYMPHOCYTES # BLD AUTO: 2.11 10*3/MM3 (ref 0.7–3.1)
LYMPHOCYTES NFR BLD AUTO: 23.5 % (ref 19.6–45.3)
MCH RBC QN AUTO: 30.3 PG (ref 26.6–33)
MCHC RBC AUTO-ENTMCNC: 32.7 G/DL (ref 31.5–35.7)
MCV RBC AUTO: 92.6 FL (ref 79–97)
MONOCYTES # BLD AUTO: 0.67 10*3/MM3 (ref 0.1–0.9)
MONOCYTES NFR BLD AUTO: 7.5 % (ref 5–12)
NEUTROPHILS NFR BLD AUTO: 5.92 10*3/MM3 (ref 1.7–7)
NEUTROPHILS NFR BLD AUTO: 66 % (ref 42.7–76)
NRBC BLD AUTO-RTO: 0 /100 WBC (ref 0–0.2)
PLATELET # BLD AUTO: 216 10*3/MM3 (ref 140–450)
PMV BLD AUTO: 10.6 FL (ref 6–12)
POTASSIUM SERPL-SCNC: 4.7 MMOL/L (ref 3.5–5.2)
PROT SERPL-MCNC: 7.1 G/DL (ref 6–8.5)
PROTHROMBIN TIME: 10.1 SECONDS (ref 9.4–12)
QT INTERVAL: 464 MS
RBC # BLD AUTO: 4.19 10*6/MM3 (ref 4.14–5.8)
SODIUM SERPL-SCNC: 142 MMOL/L (ref 136–145)
WBC # BLD AUTO: 8.97 10*3/MM3 (ref 3.4–10.8)

## 2021-08-18 PROCEDURE — 85610 PROTHROMBIN TIME: CPT

## 2021-08-18 PROCEDURE — 85025 COMPLETE CBC W/AUTO DIFF WBC: CPT

## 2021-08-18 PROCEDURE — 80053 COMPREHEN METABOLIC PANEL: CPT

## 2021-08-18 PROCEDURE — 71046 X-RAY EXAM CHEST 2 VIEWS: CPT

## 2021-08-18 PROCEDURE — 93005 ELECTROCARDIOGRAM TRACING: CPT

## 2021-08-18 PROCEDURE — 93010 ELECTROCARDIOGRAM REPORT: CPT | Performed by: INTERNAL MEDICINE

## 2021-08-18 PROCEDURE — 85730 THROMBOPLASTIN TIME PARTIAL: CPT

## 2021-08-18 PROCEDURE — 36415 COLL VENOUS BLD VENIPUNCTURE: CPT

## 2021-08-19 PROBLEM — I25.10 CORONARY ARTERY DISEASE INVOLVING NATIVE CORONARY ARTERY OF NATIVE HEART WITHOUT ANGINA PECTORIS: Status: ACTIVE | Noted: 2021-08-19

## 2021-08-19 PROBLEM — I10 HYPERTENSION, ESSENTIAL: Status: ACTIVE | Noted: 2021-08-19

## 2021-08-19 NOTE — PROGRESS NOTES
[facility]  Cardiology progress Note    Patient Name: Gabriele Chau  : 1965    CHIEF COMPLAINT  Coronary artery disease      Subjective   Subjective     HISTORY OF PRESENT ILLNESS    Gabriele Chau is a 55 y.o. male with history of coronary disease s/p CABG.  Peripheral vascular disease.  Denies any chest pain.    Review of Systems:   Constitutional no fever,  no weight loss   Skin no rash   Otolaryngeal no difficulty swallowing   Cardiovascular See HPI   Pulmonary no cough, no sputum production   Gastrointestinal no constipation, no diarrhea   Genitourinary no dysuria, no hematuria   Hematologic no easy bruisability, no abnormal bleeding   Musculoskeletal no muscle pain   Neurologic no dizziness, no falls         Personal History     Social History:      Home Medications:  Current Outpatient Medications on File Prior to Visit   Medication Sig   • losartan (COZAAR) 100 MG tablet Take 1 tablet by mouth Daily.   • rosuvastatin (CRESTOR) 20 MG tablet Take 1 tablet by mouth Every Night.   • Xarelto 2.5 MG tablet TAKE ONE TABLET BY MOUTH TWICE A DAY WITH MEALS     No current facility-administered medications on file prior to visit.     Allergies:  Not on File    Objective    Objective       Vitals:      There is no height or weight on file to calculate BMI.     Physical Exam:   Constitutional: Awake, alert, No acute distress    Eyes: PERRLA, sclerae anicteric, no conjunctival injection   HENT: NCAT, mucous membranes moist   Neck: Supple, no thyromegaly, no lymphadenopathy, trachea midline   Respiratory: Clear to auscultation bilaterally, nonlabored respirations    Cardiovascular: RRR, no murmurs or rubs. Palpable pedal pulses bilaterally   Gastrointestinal: Positive bowel sounds, soft, nontender, nondistended   Musculoskeletal: No bilateral ankle edema, no cyanosis to extremities   Psychiatric: Appropriate affect, cooperative   Neurologic: Oriented x 3, strength symmetric in all  extremities, Cranial Nerves grossly intact to confrontation, speech clear   Skin: No rashes.    Result Review    Result Review:  I have personally reviewed the available results from  [x]  Laboratory  [x]  EKG  [x]  Cardiology  [x]  Medications  [x]  Old records  []  Other:   Procedures  No results found for: CHOL  Lab Results   Component Value Date    TRIG 61 01/24/2020     Lab Results   Component Value Date    HDL 49 01/24/2020     Lab Results   Component Value Date     (H) 01/24/2020     Lab Results   Component Value Date    VLDL 12 01/24/2020         Impression/Plan:  1.  Coronary disease s/p CABG: Stable.  Continue aspirin.  Reviewed his reports from [facility].  2.  Hyperlipidemia: Continue Crestor.  Lipid profile reviewed.  Continue Crestor.  Check lipid hepatic profile next visit.    3.  Hypertension: Continue losartan.  Low-salt diet advised.  4.  Bilateral peripheral vascular disease: Managed by vascular surgeon in [place].           Paxton Reeves MD   08/19/21   19:14 EDT

## 2021-08-20 ENCOUNTER — OFFICE VISIT (OUTPATIENT)
Dept: CARDIOLOGY | Facility: CLINIC | Age: 56
End: 2021-08-20

## 2021-08-20 VITALS
DIASTOLIC BLOOD PRESSURE: 82 MMHG | HEART RATE: 51 BPM | HEIGHT: 72 IN | BODY MASS INDEX: 18.69 KG/M2 | SYSTOLIC BLOOD PRESSURE: 164 MMHG | WEIGHT: 138 LBS

## 2021-08-20 DIAGNOSIS — I25.10 CORONARY ARTERY DISEASE INVOLVING NATIVE CORONARY ARTERY OF NATIVE HEART WITHOUT ANGINA PECTORIS: Primary | ICD-10-CM

## 2021-08-20 DIAGNOSIS — I73.9 PVD (PERIPHERAL VASCULAR DISEASE) WITH CLAUDICATION (HCC): ICD-10-CM

## 2021-08-20 DIAGNOSIS — I10 HYPERTENSION, ESSENTIAL: ICD-10-CM

## 2021-08-20 DIAGNOSIS — E78.2 HYPERLIPEMIA, MIXED: ICD-10-CM

## 2021-08-20 PROCEDURE — 99214 OFFICE O/P EST MOD 30 MIN: CPT | Performed by: SPECIALIST

## 2021-09-20 ENCOUNTER — OFFICE VISIT (OUTPATIENT)
Dept: FAMILY MEDICINE CLINIC | Facility: CLINIC | Age: 56
End: 2021-09-20

## 2021-09-20 VITALS
TEMPERATURE: 98.2 F | HEART RATE: 76 BPM | OXYGEN SATURATION: 96 % | WEIGHT: 129 LBS | DIASTOLIC BLOOD PRESSURE: 80 MMHG | SYSTOLIC BLOOD PRESSURE: 129 MMHG | BODY MASS INDEX: 17.5 KG/M2

## 2021-09-20 DIAGNOSIS — I10 ESSENTIAL HYPERTENSION: ICD-10-CM

## 2021-09-20 DIAGNOSIS — N28.9 RENAL INSUFFICIENCY: Primary | ICD-10-CM

## 2021-09-20 PROCEDURE — 80053 COMPREHEN METABOLIC PANEL: CPT | Performed by: FAMILY MEDICINE

## 2021-09-20 PROCEDURE — 99214 OFFICE O/P EST MOD 30 MIN: CPT | Performed by: FAMILY MEDICINE

## 2021-09-20 PROCEDURE — 85025 COMPLETE CBC W/AUTO DIFF WBC: CPT | Performed by: FAMILY MEDICINE

## 2021-09-20 RX ORDER — HYDROCODONE BITARTRATE AND ACETAMINOPHEN 5; 325 MG/1; MG/1
TABLET ORAL
COMMUNITY
Start: 2021-09-07 | End: 2022-11-07

## 2021-09-20 RX ORDER — CLOPIDOGREL BISULFATE 75 MG/1
TABLET ORAL
COMMUNITY
Start: 2021-09-07 | End: 2022-03-29

## 2021-09-20 NOTE — PROGRESS NOTES
Chief Complaint  Post-op Problem (Pt had surgery/bypass on June 7th and vascular surgery on legs 8/31 and needs labs and follow-up with [place] Dr. )    Subjective          Gabriele Myerson presents to [facility]  Pt has had cardiac bypass surgery and vascular surgery in legs in June and August 2021- pt has had decreased kidney function- needs to recheck and if still decreased- will refer to nephrology    ROS: pt has had no recent fevers, soa, cough, vision changes, headaches, chest pains, palpitations, syncope, dizziness, nausea, vomiting, diarrhea, abdominal pain, rashes, joint pain, depression, anxiety, memory problems, leg swelling.      Wounds currently healing, no signs of infection- closed    Hypertension  This is a chronic problem. The current episode started more than 1 year ago. The problem has been resolved since onset. The problem is controlled. Pertinent negatives include no anxiety, blurred vision, chest pain, headaches, malaise/fatigue, neck pain, orthopnea, palpitations, peripheral edema, PND, shortness of breath or sweats. There are no associated agents to hypertension. Risk factors for coronary artery disease include family history and male gender.   Hyperlipidemia  This is a chronic problem. The current episode started more than 1 year ago. The problem is uncontrolled. Recent lipid tests were reviewed and are variable. There are no known factors aggravating his hyperlipidemia. Pertinent negatives include no chest pain, focal sensory loss, focal weakness, leg pain, myalgias or shortness of breath. Current antihyperlipidemic treatment includes statins. The current treatment provides moderate improvement of lipids. There are no compliance problems.        Objective   Allergies   Allergen Reactions   • Atorvastatin Diarrhea     Other reaction(s): Loose stools     • Xarelto [Rivaroxaban] Hives     Immunization History   Administered Date(s) Administered   •  Influenza, Unspecified 01/23/2020       Vital Signs:   Vitals:    09/20/21 1525   BP: 129/80   Pulse: 76   Temp: 98.2 °F (36.8 °C)   SpO2: 96%   Weight: 58.5 kg (129 lb)       Physical Exam  Vitals reviewed.   Constitutional:       Appearance: Normal appearance. He is well-developed.   HENT:      Head: Normocephalic and atraumatic.      Right Ear: External ear normal.      Left Ear: External ear normal.      Nose: Nose normal.      Mouth/Throat:      Mouth: Mucous membranes are moist.      Pharynx: Oropharynx is clear. No oropharyngeal exudate or posterior oropharyngeal erythema.   Eyes:      Conjunctiva/sclera: Conjunctivae normal.      Pupils: Pupils are equal, round, and reactive to light.   Cardiovascular:      Rate and Rhythm: Normal rate and regular rhythm.      Pulses: Normal pulses.      Heart sounds: Normal heart sounds. No murmur heard.   No friction rub. No gallop.    Pulmonary:      Effort: Pulmonary effort is normal.      Breath sounds: Normal breath sounds. No wheezing or rhonchi.   Abdominal:      General: Bowel sounds are normal. There is no distension.      Palpations: Abdomen is soft.      Tenderness: There is no abdominal tenderness.   Musculoskeletal:         General: Normal range of motion.      Cervical back: Normal range of motion and neck supple.   Skin:     General: Skin is warm and dry.      Comments: No redness, warmth, discharge, tenderness, swelling, or bruising.   Neurological:      Mental Status: He is alert and oriented to person, place, and time.      Cranial Nerves: No cranial nerve deficit.   Psychiatric:         Mood and Affect: Mood and affect normal.         Behavior: Behavior normal.         Thought Content: Thought content normal.         Judgment: Judgment normal.        Result Review :   The following data was reviewed by: Carlo Hernandez MD on 09/20/2021:  CMP    CMP 5/10/21 8/18/21   Glucose  92   Glucose 96    BUN 18 12   Creatinine 1.33 (A) 1.46 (A)   eGFR Non  Am   50 (A)   eGFR African Am  60 (A)   Sodium 140 142   Potassium 4.3 4.7   Chloride 106 106   Calcium 8.9 9.6   Albumin  4.20   Total Bilirubin  0.4   Alkaline Phosphatase  66   AST (SGOT)  22   ALT (SGPT)  22   (A) Abnormal value            CBC    CBC 9/2/21 9/3/21 9/6/21   WBC 10.26 8.79 10.80   RBC 3.25 (A) 3.05 (A) 3.69 (A)   Hemoglobin 9.7 (A) 9.4 (A) 11.1 (A)   Hematocrit 30.4 (A) 28.5 (A) 34.1 (A)   MCV 93.5 93.4 92.4   MCH 29.8 30.8 30.1   MCHC 31.9 33.0 32.6   RDW 13.9 13.9 13.2   Platelets 186 168 296   (A) Abnormal value                      Assessment and Plan    Diagnoses and all orders for this visit:    1. Renal insufficiency (Primary)    2. Essential hypertension  -     CBC Auto Differential  -     Comprehensive Metabolic Panel  -     Lipid Panel; Future            Follow Up   Return in about 6 months (around 3/20/2022) for Recheck.  Patient was given instructions and counseling regarding his condition or for health maintenance advice. Please see specific information pulled into the AVS if appropriate.

## 2021-09-20 NOTE — PROGRESS NOTES
Venipuncture Blood Specimen Collection  Venipuncture performed in left arm by Yuli Hua with good hemostasis. Patient tolerated the procedure well without complications.   09/20/21   Yuli Hua

## 2021-09-21 LAB
ALBUMIN SERPL-MCNC: 4.8 G/DL (ref 3.5–5.2)
ALBUMIN/GLOB SERPL: 1.8 G/DL
ALP SERPL-CCNC: 71 U/L (ref 39–117)
ALT SERPL W P-5'-P-CCNC: 41 U/L (ref 1–41)
ANION GAP SERPL CALCULATED.3IONS-SCNC: 9.7 MMOL/L (ref 5–15)
AST SERPL-CCNC: 31 U/L (ref 1–40)
BASOPHILS # BLD AUTO: 0.09 10*3/MM3 (ref 0–0.2)
BASOPHILS NFR BLD AUTO: 0.9 % (ref 0–1.5)
BILIRUB SERPL-MCNC: 0.3 MG/DL (ref 0–1.2)
BUN SERPL-MCNC: 15 MG/DL (ref 6–20)
BUN/CREAT SERPL: 11 (ref 7–25)
CALCIUM SPEC-SCNC: 10.3 MG/DL (ref 8.6–10.5)
CHLORIDE SERPL-SCNC: 104 MMOL/L (ref 98–107)
CO2 SERPL-SCNC: 27.3 MMOL/L (ref 22–29)
CREAT SERPL-MCNC: 1.36 MG/DL (ref 0.76–1.27)
DEPRECATED RDW RBC AUTO: 49.6 FL (ref 37–54)
EOSINOPHIL # BLD AUTO: 0.26 10*3/MM3 (ref 0–0.4)
EOSINOPHIL NFR BLD AUTO: 2.6 % (ref 0.3–6.2)
ERYTHROCYTE [DISTWIDTH] IN BLOOD BY AUTOMATED COUNT: 13.6 % (ref 12.3–15.4)
GFR SERPL CREATININE-BSD FRML MDRD: 54 ML/MIN/1.73
GFR SERPL CREATININE-BSD FRML MDRD: 65 ML/MIN/1.73
GLOBULIN UR ELPH-MCNC: 2.7 GM/DL
GLUCOSE SERPL-MCNC: 86 MG/DL (ref 65–99)
HCT VFR BLD AUTO: 38.2 % (ref 37.5–51)
HGB BLD-MCNC: 11.8 G/DL (ref 13–17.7)
IMM GRANULOCYTES # BLD AUTO: 0.05 10*3/MM3 (ref 0–0.05)
IMM GRANULOCYTES NFR BLD AUTO: 0.5 % (ref 0–0.5)
LYMPHOCYTES # BLD AUTO: 2.89 10*3/MM3 (ref 0.7–3.1)
LYMPHOCYTES NFR BLD AUTO: 29.3 % (ref 19.6–45.3)
MCH RBC QN AUTO: 29.9 PG (ref 26.6–33)
MCHC RBC AUTO-ENTMCNC: 30.9 G/DL (ref 31.5–35.7)
MCV RBC AUTO: 97 FL (ref 79–97)
MONOCYTES # BLD AUTO: 0.71 10*3/MM3 (ref 0.1–0.9)
MONOCYTES NFR BLD AUTO: 7.2 % (ref 5–12)
NEUTROPHILS NFR BLD AUTO: 5.87 10*3/MM3 (ref 1.7–7)
NEUTROPHILS NFR BLD AUTO: 59.5 % (ref 42.7–76)
NRBC BLD AUTO-RTO: 0 /100 WBC (ref 0–0.2)
PLATELET # BLD AUTO: 435 10*3/MM3 (ref 140–450)
PMV BLD AUTO: 9.7 FL (ref 6–12)
POTASSIUM SERPL-SCNC: 4.8 MMOL/L (ref 3.5–5.2)
PROT SERPL-MCNC: 7.5 G/DL (ref 6–8.5)
RBC # BLD AUTO: 3.94 10*6/MM3 (ref 4.14–5.8)
SODIUM SERPL-SCNC: 141 MMOL/L (ref 136–145)
WBC # BLD AUTO: 9.87 10*3/MM3 (ref 3.4–10.8)

## 2021-10-04 ENCOUNTER — CLINICAL SUPPORT (OUTPATIENT)
Dept: FAMILY MEDICINE CLINIC | Facility: CLINIC | Age: 56
End: 2021-10-04

## 2021-10-04 DIAGNOSIS — I10 ESSENTIAL HYPERTENSION: ICD-10-CM

## 2021-10-04 DIAGNOSIS — I25.10 CORONARY ARTERY DISEASE INVOLVING NATIVE CORONARY ARTERY OF NATIVE HEART WITHOUT ANGINA PECTORIS: ICD-10-CM

## 2021-10-04 LAB
ALBUMIN SERPL-MCNC: 4.4 G/DL (ref 3.5–5.2)
ALP SERPL-CCNC: 54 U/L (ref 39–117)
ALT SERPL W P-5'-P-CCNC: 13 U/L (ref 1–41)
AST SERPL-CCNC: 15 U/L (ref 1–40)
BILIRUB CONJ SERPL-MCNC: <0.2 MG/DL (ref 0–0.3)
BILIRUB INDIRECT SERPL-MCNC: NORMAL MG/DL
BILIRUB SERPL-MCNC: 0.3 MG/DL (ref 0–1.2)
CHOLEST SERPL-MCNC: 104 MG/DL (ref 0–200)
HDLC SERPL-MCNC: 49 MG/DL (ref 40–60)
LDLC SERPL CALC-MCNC: 43 MG/DL (ref 0–100)
LDLC/HDLC SERPL: 0.93 {RATIO}
PROT SERPL-MCNC: 6.6 G/DL (ref 6–8.5)
TRIGL SERPL-MCNC: 47 MG/DL (ref 0–150)
VLDLC SERPL-MCNC: 12 MG/DL (ref 5–40)

## 2021-10-04 PROCEDURE — 80076 HEPATIC FUNCTION PANEL: CPT | Performed by: SPECIALIST

## 2021-10-04 PROCEDURE — 36415 COLL VENOUS BLD VENIPUNCTURE: CPT | Performed by: FAMILY MEDICINE

## 2021-10-04 PROCEDURE — 80061 LIPID PANEL: CPT | Performed by: SPECIALIST

## 2021-10-04 NOTE — PROGRESS NOTES
Venipuncture Blood Specimen Collection  Venipuncture performed in left arm  by Dannielle Escalante with good hemostasis. Patient tolerated the procedure well without complications.   10/04/21   Dannielle Escalante

## 2021-10-12 ENCOUNTER — TELEPHONE (OUTPATIENT)
Dept: FAMILY MEDICINE CLINIC | Facility: CLINIC | Age: 56
End: 2021-10-12

## 2021-10-12 NOTE — TELEPHONE ENCOUNTER
Caller: Gabriele Chau    Relationship to patient: Self    Best call back number: [phone]    Patient is needing: PATIENT CALLED STATING HE HAS YET TO GET A PHONE CALL ABOUT HIS LAB RESULTS AND WOULD LIKE A CALL BACK TO SPEAK TO CLINICAL STAFF ABOUT THEM. PLEASE ADVISE THANK YOU.         HUB STAFF UNABLE TO WARM TRANSFER

## 2021-10-13 ENCOUNTER — TELEPHONE (OUTPATIENT)
Dept: CARDIOLOGY | Facility: CLINIC | Age: 56
End: 2021-10-13

## 2021-10-15 ENCOUNTER — LAB (OUTPATIENT)
Dept: LAB | Facility: HOSPITAL | Age: 56
End: 2021-10-15

## 2021-10-15 ENCOUNTER — TELEPHONE (OUTPATIENT)
Dept: CARDIOLOGY | Facility: CLINIC | Age: 56
End: 2021-10-15

## 2021-10-15 DIAGNOSIS — R06.09 DOE (DYSPNEA ON EXERTION): Primary | ICD-10-CM

## 2021-10-15 DIAGNOSIS — Z95.1 S/P CABG (CORONARY ARTERY BYPASS GRAFT): ICD-10-CM

## 2021-10-15 DIAGNOSIS — R06.09 DOE (DYSPNEA ON EXERTION): ICD-10-CM

## 2021-10-15 LAB
ANION GAP SERPL CALCULATED.3IONS-SCNC: 10.2 MMOL/L (ref 5–15)
BUN SERPL-MCNC: 9 MG/DL (ref 6–20)
BUN/CREAT SERPL: 6 (ref 7–25)
CALCIUM SPEC-SCNC: 9.6 MG/DL (ref 8.6–10.5)
CHLORIDE SERPL-SCNC: 106 MMOL/L (ref 98–107)
CO2 SERPL-SCNC: 26.8 MMOL/L (ref 22–29)
CREAT SERPL-MCNC: 1.5 MG/DL (ref 0.76–1.27)
GFR SERPL CREATININE-BSD FRML MDRD: 48 ML/MIN/1.73
GFR SERPL CREATININE-BSD FRML MDRD: 58 ML/MIN/1.73
GLUCOSE SERPL-MCNC: 86 MG/DL (ref 65–99)
NT-PROBNP SERPL-MCNC: 579.3 PG/ML (ref 0–900)
POTASSIUM SERPL-SCNC: 4.5 MMOL/L (ref 3.5–5.2)
SODIUM SERPL-SCNC: 143 MMOL/L (ref 136–145)

## 2021-10-15 PROCEDURE — 80048 BASIC METABOLIC PNL TOTAL CA: CPT

## 2021-10-15 PROCEDURE — 83880 ASSAY OF NATRIURETIC PEPTIDE: CPT

## 2021-10-15 PROCEDURE — 36415 COLL VENOUS BLD VENIPUNCTURE: CPT

## 2021-10-15 NOTE — TELEPHONE ENCOUNTER
Per Dr Reeves, have patient get echocardiogram sometime in the next week and also BNP and BMP. Orders have been placed. He needs to follow up with me in 1-2 weeks. Thanks

## 2021-10-15 NOTE — TELEPHONE ENCOUNTER
Received VM from Xochitl patient .     Returned call to patient . Patient C/O of SOA with activity. Denies CP, edema, nor weight gain.     Patient has f/u apt on 12/21/21    Advised patient would make Dr Reeves/Velma aware and call back with recommendations.

## 2021-10-20 ENCOUNTER — TELEPHONE (OUTPATIENT)
Dept: CARDIOLOGY | Facility: CLINIC | Age: 56
End: 2021-10-20

## 2021-10-20 NOTE — TELEPHONE ENCOUNTER
----- Message from RAZIA Collins sent at 10/20/2021 11:43 AM EDT -----  Notify pt echo result: Normal left ventricular systolic function. EF 50%.  No significant valve abnormalities noted. No pericardial effusion. No cause found for shortness of breath. Continue same. Keep December follow up

## 2021-10-20 NOTE — TELEPHONE ENCOUNTER
S/W patient regarding results and recommendations. Patient also states wanting to cancel Friday apt.

## 2022-02-21 RX ORDER — ROSUVASTATIN CALCIUM 20 MG/1
TABLET, COATED ORAL
Qty: 30 TABLET | Refills: 1 | Status: SHIPPED | OUTPATIENT
Start: 2022-02-21 | End: 2022-04-29

## 2022-03-28 RX ORDER — LOSARTAN POTASSIUM 100 MG/1
TABLET ORAL
Qty: 90 TABLET | Refills: 3 | Status: SHIPPED | OUTPATIENT
Start: 2022-03-28 | End: 2023-04-04

## 2022-03-28 NOTE — PROGRESS NOTES
[facility]  Cardiology progress Note    Patient Name: Gabriele Chau  : 1965    CHIEF COMPLAINT  CORONARY ARTERY DISEASE s/p CABG.      Subjective   Subjective     HISTORY OF PRESENT ILLNESS    Gabriele Chau is a 56 y.o. male coronary artery status post CABG.  No chest pain or shortness of breath.    Review of Systems:   Constitutional no fever,  no weight loss   Skin no rash   Otolaryngeal no difficulty swallowing   Cardiovascular See HPI   Pulmonary no cough, no sputum production   Gastrointestinal no constipation, no diarrhea   Genitourinary no dysuria, no hematuria   Hematologic no easy bruisability, no abnormal bleeding   Musculoskeletal no muscle pain   Neurologic no dizziness, no falls         Personal History     Social History:  reports that he has quit smoking. He has never used smokeless tobacco. He reports that he does not drink alcohol and does not use drugs.    Home Medications:  Current Outpatient Medications on File Prior to Visit   Medication Sig   • ASPIRIN 81 PO Take 81 mg by mouth Daily.   • losartan (COZAAR) 100 MG tablet TAKE ONE TABLET BY MOUTH DAILY   • rosuvastatin (CRESTOR) 20 MG tablet TAKE ONE TABLET BY MOUTH EVERY NIGHT AT BEDTIME   • [DISCONTINUED] clopidogrel (PLAVIX) 75 MG tablet    • HYDROcodone-acetaminophen (NORCO) 5-325 MG per tablet      No current facility-administered medications on file prior to visit.     Allergies:  Allergies   Allergen Reactions   • Atorvastatin Diarrhea     Other reaction(s): Loose stools     • Xarelto [Rivaroxaban] Hives       Objective    Objective       Vitals:   Heart Rate:  [58] 58  BP: (136)/(78) 136/78  Body mass index is 18.72 kg/m².     Physical Exam:   Constitutional: Awake, alert, No acute distress    Eyes: PERRLA, sclerae anicteric, no conjunctival injection   HENT: NCAT, mucous membranes moist   Neck: Supple, no thyromegaly, no lymphadenopathy, trachea midline   Respiratory: Clear to auscultation bilaterally,  nonlabored respirations    Cardiovascular: RRR, no murmurs or rubs. Palpable pedal pulses bilaterally   Musculoskeletal: No bilateral ankle edema, no cyanosis to extremities   Psychiatric: Appropriate affect, cooperative   Neurologic: Oriented x 3, strength symmetric in all extremities, Cranial Nerves grossly intact to confrontation, speech clear   Skin: No rashes.    Result Review    Result Review:  I have personally reviewed the available results from  [x]  Laboratory  [x]  EKG  [x]  Cardiology  [x]  Medications  [x]  Old records  []  Other:   Procedures  Lab Results   Component Value Date    CHOL 104 10/04/2021     Lab Results   Component Value Date    TRIG 47 10/04/2021    TRIG 61 01/24/2020     Lab Results   Component Value Date    HDL 49 10/04/2021    HDL 49 01/24/2020     Lab Results   Component Value Date    LDL 43 10/04/2021     (H) 01/24/2020     Lab Results   Component Value Date    VLDL 12 10/04/2021    VLDL 12 01/24/2020     Results for orders placed in visit on 10/20/21    Adult Transthoracic Echo Complete W/ Cont if Necessary Per Protocol    Interpretation Summary  1.  Normal left ventricular systolic function.  2.  No significant valve abnormalities noted.     Impression/Plan:  1.  Coronary disease s/p CABG stable: Continue aspirin 81 mg a day.  Continue Plavix 75 mg a day.  2.  Essential hypertension controlled: Continue Cozaar 100 mg a day.  Blood pressure controlled at home.  3.  Hyperlipidemia: Continue Crestor 20 mg a day.  Lipid profile reviewed shows LDL 43.  Repeat lipid and hepatic profile.           Paxton Reeves MD   03/29/22   13:45 EDT    Airborne+Contact precautions

## 2022-03-29 ENCOUNTER — OFFICE VISIT (OUTPATIENT)
Dept: CARDIOLOGY | Facility: CLINIC | Age: 57
End: 2022-03-29

## 2022-03-29 VITALS
HEIGHT: 72 IN | BODY MASS INDEX: 18.69 KG/M2 | WEIGHT: 138 LBS | SYSTOLIC BLOOD PRESSURE: 136 MMHG | HEART RATE: 58 BPM | DIASTOLIC BLOOD PRESSURE: 78 MMHG

## 2022-03-29 DIAGNOSIS — E78.2 HYPERLIPEMIA, MIXED: ICD-10-CM

## 2022-03-29 DIAGNOSIS — I25.10 CORONARY ARTERY DISEASE INVOLVING NATIVE CORONARY ARTERY OF NATIVE HEART WITHOUT ANGINA PECTORIS: Primary | ICD-10-CM

## 2022-03-29 DIAGNOSIS — Z95.1 HX OF CABG: ICD-10-CM

## 2022-03-29 PROCEDURE — 99214 OFFICE O/P EST MOD 30 MIN: CPT | Performed by: SPECIALIST

## 2022-03-29 RX ORDER — CLOPIDOGREL BISULFATE 75 MG/1
75 TABLET ORAL DAILY
Qty: 30 TABLET | Refills: 6 | Status: SHIPPED | OUTPATIENT
Start: 2022-03-29

## 2022-04-29 RX ORDER — ROSUVASTATIN CALCIUM 20 MG/1
TABLET, COATED ORAL
Qty: 90 TABLET | Refills: 1 | Status: SHIPPED | OUTPATIENT
Start: 2022-04-29

## 2022-07-26 ENCOUNTER — OFFICE VISIT (OUTPATIENT)
Dept: FAMILY MEDICINE CLINIC | Facility: CLINIC | Age: 57
End: 2022-07-26

## 2022-07-26 VITALS
TEMPERATURE: 98 F | DIASTOLIC BLOOD PRESSURE: 80 MMHG | WEIGHT: 132 LBS | SYSTOLIC BLOOD PRESSURE: 150 MMHG | HEART RATE: 82 BPM | OXYGEN SATURATION: 98 % | BODY MASS INDEX: 17.9 KG/M2

## 2022-07-26 DIAGNOSIS — R11.0 NAUSEA: ICD-10-CM

## 2022-07-26 DIAGNOSIS — R53.83 FATIGUE, UNSPECIFIED TYPE: ICD-10-CM

## 2022-07-26 DIAGNOSIS — J02.9 PHARYNGITIS, UNSPECIFIED ETIOLOGY: Primary | ICD-10-CM

## 2022-07-26 DIAGNOSIS — R51.9 NONINTRACTABLE HEADACHE, UNSPECIFIED CHRONICITY PATTERN, UNSPECIFIED HEADACHE TYPE: ICD-10-CM

## 2022-07-26 LAB
ALBUMIN SERPL-MCNC: 4.2 G/DL (ref 3.5–5.2)
ALBUMIN/GLOB SERPL: 2 G/DL
ALP SERPL-CCNC: 46 U/L (ref 39–117)
ALT SERPL W P-5'-P-CCNC: 18 U/L (ref 1–41)
ANION GAP SERPL CALCULATED.3IONS-SCNC: 10 MMOL/L (ref 5–15)
AST SERPL-CCNC: 26 U/L (ref 1–40)
BASOPHILS # BLD AUTO: 0.03 10*3/MM3 (ref 0–0.2)
BASOPHILS NFR BLD AUTO: 0.6 % (ref 0–1.5)
BILIRUB SERPL-MCNC: 0.3 MG/DL (ref 0–1.2)
BUN SERPL-MCNC: 22 MG/DL (ref 6–20)
BUN/CREAT SERPL: 18.2 (ref 7–25)
CALCIUM SPEC-SCNC: 9.3 MG/DL (ref 8.6–10.5)
CHLORIDE SERPL-SCNC: 101 MMOL/L (ref 98–107)
CO2 SERPL-SCNC: 27 MMOL/L (ref 22–29)
CREAT SERPL-MCNC: 1.21 MG/DL (ref 0.76–1.27)
DEPRECATED RDW RBC AUTO: 41.6 FL (ref 37–54)
EGFRCR SERPLBLD CKD-EPI 2021: 69.4 ML/MIN/1.73
EOSINOPHIL # BLD AUTO: 0 10*3/MM3 (ref 0–0.4)
EOSINOPHIL NFR BLD AUTO: 0 % (ref 0.3–6.2)
ERYTHROCYTE [DISTWIDTH] IN BLOOD BY AUTOMATED COUNT: 12.4 % (ref 12.3–15.4)
FOLATE SERPL-MCNC: >20 NG/ML (ref 4.78–24.2)
GLOBULIN UR ELPH-MCNC: 2.1 GM/DL
GLUCOSE SERPL-MCNC: 91 MG/DL (ref 65–99)
HCT VFR BLD AUTO: 37.8 % (ref 37.5–51)
HGB BLD-MCNC: 12.5 G/DL (ref 13–17.7)
IMM GRANULOCYTES # BLD AUTO: 0.02 10*3/MM3 (ref 0–0.05)
IMM GRANULOCYTES NFR BLD AUTO: 0.4 % (ref 0–0.5)
LYMPHOCYTES # BLD AUTO: 1.23 10*3/MM3 (ref 0.7–3.1)
LYMPHOCYTES NFR BLD AUTO: 26.1 % (ref 19.6–45.3)
MAGNESIUM SERPL-MCNC: 2.1 MG/DL (ref 1.6–2.6)
MCH RBC QN AUTO: 30.4 PG (ref 26.6–33)
MCHC RBC AUTO-ENTMCNC: 33.1 G/DL (ref 31.5–35.7)
MCV RBC AUTO: 92 FL (ref 79–97)
MONOCYTES # BLD AUTO: 0.86 10*3/MM3 (ref 0.1–0.9)
MONOCYTES NFR BLD AUTO: 18.3 % (ref 5–12)
NEUTROPHILS NFR BLD AUTO: 2.57 10*3/MM3 (ref 1.7–7)
NEUTROPHILS NFR BLD AUTO: 54.6 % (ref 42.7–76)
NRBC BLD AUTO-RTO: 0 /100 WBC (ref 0–0.2)
PLATELET # BLD AUTO: 188 10*3/MM3 (ref 140–450)
PMV BLD AUTO: 10.5 FL (ref 6–12)
POTASSIUM SERPL-SCNC: 4.4 MMOL/L (ref 3.5–5.2)
PROT SERPL-MCNC: 6.3 G/DL (ref 6–8.5)
RBC # BLD AUTO: 4.11 10*6/MM3 (ref 4.14–5.8)
SODIUM SERPL-SCNC: 138 MMOL/L (ref 136–145)
T4 FREE SERPL-MCNC: 1.41 NG/DL (ref 0.93–1.7)
TSH SERPL DL<=0.05 MIU/L-ACNC: 0.01 UIU/ML (ref 0.27–4.2)
VIT B12 BLD-MCNC: 393 PG/ML (ref 211–946)
WBC NRBC COR # BLD: 4.71 10*3/MM3 (ref 3.4–10.8)

## 2022-07-26 PROCEDURE — 82607 VITAMIN B-12: CPT | Performed by: FAMILY MEDICINE

## 2022-07-26 PROCEDURE — 84439 ASSAY OF FREE THYROXINE: CPT | Performed by: FAMILY MEDICINE

## 2022-07-26 PROCEDURE — 99213 OFFICE O/P EST LOW 20 MIN: CPT | Performed by: FAMILY MEDICINE

## 2022-07-26 PROCEDURE — U0004 COV-19 TEST NON-CDC HGH THRU: HCPCS | Performed by: FAMILY MEDICINE

## 2022-07-26 PROCEDURE — 36415 COLL VENOUS BLD VENIPUNCTURE: CPT | Performed by: FAMILY MEDICINE

## 2022-07-26 PROCEDURE — 83735 ASSAY OF MAGNESIUM: CPT | Performed by: FAMILY MEDICINE

## 2022-07-26 PROCEDURE — 82746 ASSAY OF FOLIC ACID SERUM: CPT | Performed by: FAMILY MEDICINE

## 2022-07-26 PROCEDURE — 80050 GENERAL HEALTH PANEL: CPT | Performed by: FAMILY MEDICINE

## 2022-07-26 RX ORDER — ONDANSETRON 4 MG/1
4 TABLET, FILM COATED ORAL 4 TIMES DAILY PRN
Qty: 28 TABLET | Refills: 1 | Status: SHIPPED | OUTPATIENT
Start: 2022-07-26 | End: 2022-11-07

## 2022-07-26 RX ORDER — AZITHROMYCIN 250 MG/1
TABLET, FILM COATED ORAL
Qty: 6 TABLET | Refills: 0 | Status: SHIPPED | OUTPATIENT
Start: 2022-07-26 | End: 2022-11-07

## 2022-07-26 RX ORDER — PREDNISONE 20 MG/1
40 TABLET ORAL DAILY
Qty: 10 TABLET | Refills: 0 | Status: SHIPPED | OUTPATIENT
Start: 2022-07-26 | End: 2022-07-26

## 2022-07-26 NOTE — PROGRESS NOTES
Chief Complaint  Fatigue and Headache (Fatigue and headache since Sunday )    Subjective          Gabriele Chau presents to [facility]  Pt got overheated at home over the weekend- has had headache x 3 days with fatigue and chills, muscle cramps, sore throat, frontal headaches- headache is 8-9/10      Objective   Allergies   Allergen Reactions   • Atorvastatin Diarrhea     Other reaction(s): Loose stools     • Xarelto [Rivaroxaban] Hives     Immunization History   Administered Date(s) Administered   • COVID-19 (PFIZER) PURPLE CAP 07/01/2021, 07/22/2021   • Influenza, Unspecified 01/23/2020     Past Medical History:   Diagnosis Date   • High blood pressure    • Hyperlipidemia    • PVD (peripheral vascular disease) ([facility])       Past Surgical History:   Procedure Laterality Date   • EYE SURGERY     • ROTATOR CUFF REPAIR        Social History     Socioeconomic History   • Marital status:    Tobacco Use   • Smoking status: Former Smoker   • Smokeless tobacco: Never Used   Vaping Use   • Vaping Use: Never used   Substance and Sexual Activity   • Alcohol use: Never   • Drug use: Never        Current Outpatient Medications:   •  ASPIRIN 81 PO, Take 81 mg by mouth Daily., Disp: , Rfl:   •  clopidogrel (PLAVIX) 75 MG tablet, Take 1 tablet by mouth Daily., Disp: 30 tablet, Rfl: 6  •  losartan (COZAAR) 100 MG tablet, TAKE ONE TABLET BY MOUTH DAILY, Disp: 90 tablet, Rfl: 3  •  rosuvastatin (CRESTOR) 20 MG tablet, TAKE ONE TABLET BY MOUTH EVERY NIGHT AT BEDTIME, Disp: 90 tablet, Rfl: 1  •  azithromycin (Zithromax Z-Daniele) 250 MG tablet, Take 2 tablets by mouth on day 1, then 1 tablet daily on days 2-5, Disp: 6 tablet, Rfl: 0  •  HYDROcodone-acetaminophen (NORCO) 5-325 MG per tablet, , Disp: , Rfl:   •  ondansetron (Zofran) 4 MG tablet, Take 1 tablet by mouth 4 (Four) Times a Day As Needed for Nausea or Vomiting., Disp: 28 tablet, Rfl: 1   Family History   Problem Relation Age of Onset    • Heart attack Father    • Hypertension Father    • Hypertension Paternal Uncle           Vital Signs:   Vitals:    07/26/22 1605   BP: 150/80   Pulse: 82   Temp: 98 °F (36.7 °C)   SpO2: 98%   Weight: 59.9 kg (132 lb)       Review of Systems   Constitutional: Positive for fatigue and fever.   HENT: Positive for sore throat.    Eyes: Negative for visual disturbance.   Respiratory: Negative for cough, choking, shortness of breath and wheezing.    Cardiovascular: Negative for chest pain, palpitations and leg swelling.   Gastrointestinal: Negative for abdominal pain, diarrhea, nausea and vomiting.   Skin: Negative for rash.   Neurological: Positive for headaches. Negative for dizziness, syncope, light-headedness and numbness.   Psychiatric/Behavioral: Negative for decreased concentration and dysphoric mood.      Physical Exam  Vitals reviewed.   Constitutional:       Appearance: Normal appearance. He is well-developed.   HENT:      Head: Normocephalic and atraumatic.      Right Ear: Tympanic membrane, ear canal and external ear normal.      Left Ear: Tympanic membrane, ear canal and external ear normal.      Nose: Nose normal.      Mouth/Throat:      Mouth: Mucous membranes are moist.      Pharynx: Oropharynx is clear. Posterior oropharyngeal erythema present. No oropharyngeal exudate.   Eyes:      Extraocular Movements: Extraocular movements intact.      Conjunctiva/sclera: Conjunctivae normal.      Pupils: Pupils are equal, round, and reactive to light.   Cardiovascular:      Rate and Rhythm: Normal rate and regular rhythm.      Pulses: Normal pulses.      Heart sounds: Normal heart sounds. No murmur heard.    No friction rub. No gallop.   Pulmonary:      Effort: Pulmonary effort is normal.      Breath sounds: Normal breath sounds. No wheezing or rhonchi.   Abdominal:      General: Abdomen is flat. Bowel sounds are normal. There is no distension.      Palpations: Abdomen is soft. There is no mass.      Tenderness:  There is no abdominal tenderness. There is no guarding or rebound.      Hernia: No hernia is present.   Musculoskeletal:         General: Normal range of motion.      Cervical back: Normal range of motion and neck supple.   Skin:     General: Skin is warm and dry.      Capillary Refill: Capillary refill takes less than 2 seconds.   Neurological:      General: No focal deficit present.      Mental Status: He is alert and oriented to person, place, and time.      Cranial Nerves: No cranial nerve deficit.   Psychiatric:         Mood and Affect: Mood and affect normal.         Behavior: Behavior normal.         Thought Content: Thought content normal.         Judgment: Judgment normal.        Result Review :                 Assessment and Plan    Diagnoses and all orders for this visit:    1. Pharyngitis, unspecified etiology (Primary)  -     COVID-19,APTIMA PANTHER(ERINN), AIDA/ NOAH, NP/OP SWAB IN UTM/VTM/SALINE TRANSPORT MEDIA,24 HR TAT - Swab, Nasopharynx  -     Discontinue: predniSONE (DELTASONE) 20 MG tablet; Take 2 tablets by mouth Daily for 5 days.  Dispense: 10 tablet; Refill: 0  -     azithromycin (Zithromax Z-Daniele) 250 MG tablet; Take 2 tablets by mouth on day 1, then 1 tablet daily on days 2-5  Dispense: 6 tablet; Refill: 0    2. Nonintractable headache, unspecified chronicity pattern, unspecified headache type  -     MRI Brain With & Without Contrast; Future  -     Magnesium    3. Fatigue, unspecified type  -     CBC Auto Differential  -     Comprehensive Metabolic Panel  -     Vitamin B12 & Folate  -     TSH+Free T4    4. Nausea  -     ondansetron (Zofran) 4 MG tablet; Take 1 tablet by mouth 4 (Four) Times a Day As Needed for Nausea or Vomiting.  Dispense: 28 tablet; Refill: 1            Follow Up   Return if symptoms worsen or fail to improve.  Patient was given instructions and counseling regarding his condition or for health maintenance advice. Please see specific information pulled into the AVS if  appropriate.     Pt told to go straight to ER if symptoms or headache worsens or he develops any neuro symptoms.

## 2022-07-26 NOTE — PROGRESS NOTES
Venipuncture Blood Specimen Collection  Venipuncture performed in left arm  by Dannielle Escalante with good hemostasis. Patient tolerated the procedure well without complications.   07/26/22   Dannielle Escalante

## 2022-07-27 LAB — SARS-COV-2 RNA PNL SPEC NAA+PROBE: DETECTED

## 2022-08-02 ENCOUNTER — OFFICE VISIT (OUTPATIENT)
Dept: FAMILY MEDICINE CLINIC | Facility: CLINIC | Age: 57
End: 2022-08-02

## 2022-08-02 VITALS — HEART RATE: 88 BPM | TEMPERATURE: 99.1 F | OXYGEN SATURATION: 97 %

## 2022-08-02 DIAGNOSIS — K21.9 GASTROESOPHAGEAL REFLUX DISEASE, UNSPECIFIED WHETHER ESOPHAGITIS PRESENT: Primary | ICD-10-CM

## 2022-08-02 DIAGNOSIS — R07.9 CHEST PAIN, UNSPECIFIED TYPE: ICD-10-CM

## 2022-08-02 DIAGNOSIS — J02.9 PHARYNGITIS, UNSPECIFIED ETIOLOGY: ICD-10-CM

## 2022-08-02 PROCEDURE — 85025 COMPLETE CBC W/AUTO DIFF WBC: CPT | Performed by: FAMILY MEDICINE

## 2022-08-02 PROCEDURE — 84484 ASSAY OF TROPONIN QUANT: CPT | Performed by: FAMILY MEDICINE

## 2022-08-02 PROCEDURE — 83735 ASSAY OF MAGNESIUM: CPT | Performed by: FAMILY MEDICINE

## 2022-08-02 PROCEDURE — 80053 COMPREHEN METABOLIC PANEL: CPT | Performed by: FAMILY MEDICINE

## 2022-08-02 PROCEDURE — 93000 ELECTROCARDIOGRAM COMPLETE: CPT | Performed by: FAMILY MEDICINE

## 2022-08-02 PROCEDURE — 99213 OFFICE O/P EST LOW 20 MIN: CPT | Performed by: FAMILY MEDICINE

## 2022-08-02 RX ORDER — CEFDINIR 300 MG/1
300 CAPSULE ORAL 2 TIMES DAILY
Qty: 14 CAPSULE | Refills: 0 | Status: SHIPPED | OUTPATIENT
Start: 2022-08-02 | End: 2022-08-09

## 2022-08-02 RX ORDER — PANTOPRAZOLE SODIUM 40 MG/1
40 TABLET, DELAYED RELEASE ORAL DAILY
Qty: 30 TABLET | Refills: 0 | Status: SHIPPED | OUTPATIENT
Start: 2022-08-02 | End: 2022-11-07

## 2022-08-02 RX ORDER — SACCHAROMYCES BOULARDII 250 MG
250 CAPSULE ORAL 2 TIMES DAILY
Qty: 20 CAPSULE | Refills: 0 | Status: SHIPPED | OUTPATIENT
Start: 2022-08-02 | End: 2022-08-12

## 2022-08-02 NOTE — PROGRESS NOTES
Chief Complaint  Sore Throat (Covid positive a week or so a go and then he woke up Sunday with a sore throat, chest hurting on  her right side some, no cough, no fever, no vomiting or diarrhea.)    Subjective          Gabriele Chau presents to [facility]  Pt diagnosed with covid last week- he has had worsening symptoms with cough and chest pain and GERD x 3-4 days- last episode of chest pain was earlier today- episodes last a couple minutes    Chest Pain   This is a new problem. Episode onset: 3 days. The onset quality is sudden. The problem occurs intermittently. The problem has been gradually worsening. Pain location: right sided. The pain is moderate. The quality of the pain is described as burning. The pain does not radiate. Associated symptoms include a cough and malaise/fatigue. Pertinent negatives include no abdominal pain, back pain, claudication, diaphoresis, dizziness, exertional chest pressure, fever, headaches, hemoptysis, irregular heartbeat, leg pain, lower extremity edema, nausea, near-syncope, numbness, orthopnea, palpitations, PND, shortness of breath, sputum production, syncope, vomiting or weakness. Nothing relieves the cough. He has tried nothing for the symptoms.       Objective   Allergies   Allergen Reactions   • Atorvastatin Diarrhea     Other reaction(s): Loose stools     • Xarelto [Rivaroxaban] Hives     Immunization History   Administered Date(s) Administered   • COVID-19 (PFIZER) PURPLE CAP 07/01/2021, 07/22/2021   • Influenza, Unspecified 01/23/2020     Past Medical History:   Diagnosis Date   • High blood pressure    • Hyperlipidemia    • PVD (peripheral vascular disease) ([facility])       Past Surgical History:   Procedure Laterality Date   • EYE SURGERY     • ROTATOR CUFF REPAIR        Social History     Socioeconomic History   • Marital status:    Tobacco Use   • Smoking status: Former Smoker   • Smokeless tobacco: Never Used   Vaping Use   •  Vaping Use: Never used   Substance and Sexual Activity   • Alcohol use: Never   • Drug use: Never        Current Outpatient Medications:   •  ASPIRIN 81 PO, Take 81 mg by mouth Daily., Disp: , Rfl:   •  azithromycin (Zithromax Z-Daniele) 250 MG tablet, Take 2 tablets by mouth on day 1, then 1 tablet daily on days 2-5, Disp: 6 tablet, Rfl: 0  •  clopidogrel (PLAVIX) 75 MG tablet, Take 1 tablet by mouth Daily., Disp: 30 tablet, Rfl: 6  •  HYDROcodone-acetaminophen (NORCO) 5-325 MG per tablet, , Disp: , Rfl:   •  losartan (COZAAR) 100 MG tablet, TAKE ONE TABLET BY MOUTH DAILY, Disp: 90 tablet, Rfl: 3  •  ondansetron (Zofran) 4 MG tablet, Take 1 tablet by mouth 4 (Four) Times a Day As Needed for Nausea or Vomiting., Disp: 28 tablet, Rfl: 1  •  rosuvastatin (CRESTOR) 20 MG tablet, TAKE ONE TABLET BY MOUTH EVERY NIGHT AT BEDTIME, Disp: 90 tablet, Rfl: 1  •  cefdinir (OMNICEF) 300 MG capsule, Take 1 capsule by mouth 2 (Two) Times a Day for 7 days., Disp: 14 capsule, Rfl: 0  •  pantoprazole (Protonix) 40 MG EC tablet, Take 1 tablet by mouth Daily., Disp: 30 tablet, Rfl: 0  •  saccharomyces boulardii (Florastor) 250 MG capsule, Take 1 capsule by mouth 2 (Two) Times a Day for 10 days., Disp: 20 capsule, Rfl: 0   Family History   Problem Relation Age of Onset   • Heart attack Father    • Hypertension Father    • Hypertension Paternal Uncle           Vital Signs:   Vitals:    08/02/22 1640   Pulse: 88   Temp: 99.1 °F (37.3 °C)   TempSrc: Temporal   SpO2: 97%       Review of Systems   Constitutional: Positive for malaise/fatigue. Negative for diaphoresis and fever.   Respiratory: Positive for cough. Negative for hemoptysis, sputum production and shortness of breath.    Cardiovascular: Positive for chest pain. Negative for palpitations, orthopnea, claudication, syncope, PND and near-syncope.   Gastrointestinal: Negative for abdominal pain, nausea and vomiting.   Musculoskeletal: Negative for back pain.   Neurological: Negative for  dizziness, weakness, numbness and headaches.      Physical Exam  Vitals reviewed.   Constitutional:       Appearance: Normal appearance. He is well-developed.   HENT:      Head: Normocephalic and atraumatic.      Right Ear: Tympanic membrane, ear canal and external ear normal.      Left Ear: Tympanic membrane, ear canal and external ear normal.      Nose: Nose normal.      Mouth/Throat:      Mouth: Mucous membranes are moist.      Pharynx: Oropharynx is clear. Posterior oropharyngeal erythema present. No oropharyngeal exudate.   Eyes:      Conjunctiva/sclera: Conjunctivae normal.      Pupils: Pupils are equal, round, and reactive to light.   Cardiovascular:      Rate and Rhythm: Normal rate and regular rhythm.      Pulses: Normal pulses.      Heart sounds: Normal heart sounds. No murmur heard.    No friction rub. No gallop.   Pulmonary:      Effort: Pulmonary effort is normal.      Breath sounds: Normal breath sounds. No wheezing or rhonchi.   Abdominal:      General: Abdomen is flat. Bowel sounds are normal. There is no distension.      Palpations: Abdomen is soft. There is no mass.      Tenderness: There is no abdominal tenderness. There is no guarding or rebound.      Hernia: No hernia is present.   Musculoskeletal:         General: Normal range of motion.      Cervical back: Normal range of motion and neck supple.   Skin:     General: Skin is warm and dry.      Capillary Refill: Capillary refill takes less than 2 seconds.   Neurological:      General: No focal deficit present.      Mental Status: He is alert and oriented to person, place, and time.      Cranial Nerves: No cranial nerve deficit.   Psychiatric:         Mood and Affect: Mood and affect normal.         Behavior: Behavior normal.         Thought Content: Thought content normal.         Judgment: Judgment normal.        Result Review :   The following data was reviewed by: Carlo Hernandez MD on 08/02/2022:    Data reviewed: Radiologic studies I viewed  and interpreted 2 views chest x-rays:NAD.     ECG 12 Lead    Date/Time: 8/2/2022 6:00 PM  Performed by: Carlo Hernandez MD  Authorized by: Carlo Hernandez MD   Comparison: compared with previous ECG from 8/18/2021  Similar to previous ECG  Rhythm: sinus bradycardia  Rate: normal  Conduction: conduction normal  ST Segments: ST segments normal  T Waves: T waves normal  QRS axis: normal  Other: no other findings  Lead: right-sided leads used    Clinical impression: non-specific ECG              Assessment and Plan    Diagnoses and all orders for this visit:    1. Gastroesophageal reflux disease, unspecified whether esophagitis present (Primary)  -     pantoprazole (Protonix) 40 MG EC tablet; Take 1 tablet by mouth Daily.  Dispense: 30 tablet; Refill: 0    2. Pharyngitis, unspecified etiology  -     cefdinir (OMNICEF) 300 MG capsule; Take 1 capsule by mouth 2 (Two) Times a Day for 7 days.  Dispense: 14 capsule; Refill: 0  -     saccharomyces boulardii (Florastor) 250 MG capsule; Take 1 capsule by mouth 2 (Two) Times a Day for 10 days.  Dispense: 20 capsule; Refill: 0    3. Chest pain, unspecified type  -     ECG 12 Lead  -     Ambulatory Referral to Cardiology  -     XR Chest PA & Lateral (In Office)  -     Troponin T ([facility])  -     CBC Auto Differential  -     Comprehensive Metabolic Panel  -     Magnesium            Follow Up   Return if symptoms worsen or fail to improve.  Patient was given instructions and counseling regarding his condition or for health maintenance advice. Please see specific information pulled into the AVS if appropriate.   Pt told to go straight to ER if chest pains return and are lasting longer than 5 minutes.

## 2022-08-03 LAB
ALBUMIN SERPL-MCNC: 4.1 G/DL (ref 3.5–5.2)
ALBUMIN/GLOB SERPL: 1.5 G/DL
ALP SERPL-CCNC: 50 U/L (ref 39–117)
ALT SERPL W P-5'-P-CCNC: 18 U/L (ref 1–41)
ANION GAP SERPL CALCULATED.3IONS-SCNC: 11 MMOL/L (ref 5–15)
AST SERPL-CCNC: 26 U/L (ref 1–40)
BASOPHILS # BLD AUTO: 0.02 10*3/MM3 (ref 0–0.2)
BASOPHILS NFR BLD AUTO: 0.2 % (ref 0–1.5)
BILIRUB SERPL-MCNC: 0.3 MG/DL (ref 0–1.2)
BUN SERPL-MCNC: 14 MG/DL (ref 6–20)
BUN/CREAT SERPL: 11.5 (ref 7–25)
CALCIUM SPEC-SCNC: 9.1 MG/DL (ref 8.6–10.5)
CHLORIDE SERPL-SCNC: 102 MMOL/L (ref 98–107)
CO2 SERPL-SCNC: 24 MMOL/L (ref 22–29)
CREAT SERPL-MCNC: 1.22 MG/DL (ref 0.76–1.27)
DEPRECATED RDW RBC AUTO: 41.5 FL (ref 37–54)
EGFRCR SERPLBLD CKD-EPI 2021: 68.7 ML/MIN/1.73
EOSINOPHIL # BLD AUTO: 0.13 10*3/MM3 (ref 0–0.4)
EOSINOPHIL NFR BLD AUTO: 1.6 % (ref 0.3–6.2)
ERYTHROCYTE [DISTWIDTH] IN BLOOD BY AUTOMATED COUNT: 12.7 % (ref 12.3–15.4)
GLOBULIN UR ELPH-MCNC: 2.8 GM/DL
GLUCOSE SERPL-MCNC: 83 MG/DL (ref 65–99)
HCT VFR BLD AUTO: 39 % (ref 37.5–51)
HGB BLD-MCNC: 13.2 G/DL (ref 13–17.7)
IMM GRANULOCYTES # BLD AUTO: 0.07 10*3/MM3 (ref 0–0.05)
IMM GRANULOCYTES NFR BLD AUTO: 0.8 % (ref 0–0.5)
LYMPHOCYTES # BLD AUTO: 2.27 10*3/MM3 (ref 0.7–3.1)
LYMPHOCYTES NFR BLD AUTO: 27.4 % (ref 19.6–45.3)
MAGNESIUM SERPL-MCNC: 2.6 MG/DL (ref 1.6–2.6)
MCH RBC QN AUTO: 30.6 PG (ref 26.6–33)
MCHC RBC AUTO-ENTMCNC: 33.8 G/DL (ref 31.5–35.7)
MCV RBC AUTO: 90.3 FL (ref 79–97)
MONOCYTES # BLD AUTO: 0.67 10*3/MM3 (ref 0.1–0.9)
MONOCYTES NFR BLD AUTO: 8.1 % (ref 5–12)
NEUTROPHILS NFR BLD AUTO: 5.13 10*3/MM3 (ref 1.7–7)
NEUTROPHILS NFR BLD AUTO: 61.9 % (ref 42.7–76)
NRBC BLD AUTO-RTO: 0 /100 WBC (ref 0–0.2)
PLATELET # BLD AUTO: 263 10*3/MM3 (ref 140–450)
PMV BLD AUTO: 10.9 FL (ref 6–12)
POTASSIUM SERPL-SCNC: 4.7 MMOL/L (ref 3.5–5.2)
PROT SERPL-MCNC: 6.9 G/DL (ref 6–8.5)
RBC # BLD AUTO: 4.32 10*6/MM3 (ref 4.14–5.8)
SODIUM SERPL-SCNC: 137 MMOL/L (ref 136–145)
WBC NRBC COR # BLD: 8.29 10*3/MM3 (ref 3.4–10.8)

## 2022-08-04 LAB — TROPONIN T SERPL HS-MCNC: 8 NG/L (ref 0–22)

## 2022-08-30 DIAGNOSIS — K21.9 GASTROESOPHAGEAL REFLUX DISEASE, UNSPECIFIED WHETHER ESOPHAGITIS PRESENT: ICD-10-CM

## 2022-08-30 RX ORDER — PANTOPRAZOLE SODIUM 40 MG/1
TABLET, DELAYED RELEASE ORAL
Qty: 30 TABLET | Refills: 0 | OUTPATIENT
Start: 2022-08-30

## 2022-11-07 ENCOUNTER — OFFICE VISIT (OUTPATIENT)
Dept: CARDIOLOGY | Facility: CLINIC | Age: 57
End: 2022-11-07

## 2022-11-07 VITALS
HEIGHT: 72 IN | BODY MASS INDEX: 18.28 KG/M2 | WEIGHT: 135 LBS | DIASTOLIC BLOOD PRESSURE: 72 MMHG | SYSTOLIC BLOOD PRESSURE: 127 MMHG | HEART RATE: 55 BPM

## 2022-11-07 DIAGNOSIS — I25.10 CORONARY ARTERY DISEASE INVOLVING NATIVE CORONARY ARTERY OF NATIVE HEART WITHOUT ANGINA PECTORIS: Primary | ICD-10-CM

## 2022-11-07 DIAGNOSIS — E78.2 HYPERLIPEMIA, MIXED: ICD-10-CM

## 2022-11-07 DIAGNOSIS — I10 HYPERTENSION, ESSENTIAL: ICD-10-CM

## 2022-11-07 PROBLEM — M19.90 ARTHRITIS: Status: ACTIVE | Noted: 2022-11-07

## 2022-11-07 PROBLEM — M47.817 LUMBOSACRAL SPONDYLOSIS: Status: ACTIVE | Noted: 2017-02-01

## 2022-11-07 PROBLEM — J30.2 SEASONAL ALLERGIC RHINITIS: Status: ACTIVE | Noted: 2022-11-07

## 2022-11-07 PROCEDURE — 99214 OFFICE O/P EST MOD 30 MIN: CPT | Performed by: NURSE PRACTITIONER

## 2022-11-07 NOTE — PROGRESS NOTES
"Chief Complaint  Follow-up    Subjective            History of Present Illness  Gabriele Chau is a 56-year-old male patient who presents to the office today for follow up. He has CAD with prior CABG, hypertension, and hyperlipidemia. He reports that since had Covid about 3 months ago he has been experiencing some occasional exertional chest pain and shortness of breath.. He admits that the episodes come in brief periods and do sometimes require him to stop what he is doing and sit down \"for a while\". He denies any lightheadedness/dizziness, palpitations or edema. He reports compliance with medications.     [facility]  Past Medical History:   Diagnosis Date   • CAD 8/19/2021   • High blood pressure    • Hyperlipidemia    • PVD (peripheral vascular disease) ([facility])          ALLERGY  Allergies   Allergen Reactions   • Atorvastatin Diarrhea     Other reaction(s): Loose stools     • Xarelto [Rivaroxaban] Hives          SURGICALHX  Past Surgical History:   Procedure Laterality Date   • EYE SURGERY     • ROTATOR CUFF REPAIR            SOC  Social History     Socioeconomic History   • Marital status:    Tobacco Use   • Smoking status: Former   • Smokeless tobacco: Never   Vaping Use   • Vaping Use: Never used   Substance and Sexual Activity   • Alcohol use: Never   • Drug use: Never         FAMHX  Family History   Problem Relation Age of Onset   • Heart attack Father    • Hypertension Father    • Hypertension Paternal Uncle           MEDSIGONLY  Current Outpatient Medications on File Prior to Visit   Medication Sig   • ASPIRIN 81 PO Take 81 mg by mouth Daily.   • clopidogrel (PLAVIX) 75 MG tablet Take 1 tablet by mouth Daily.   • losartan (COZAAR) 100 MG tablet TAKE ONE TABLET BY MOUTH DAILY   • rosuvastatin (CRESTOR) 20 MG tablet TAKE ONE TABLET BY MOUTH EVERY NIGHT AT BEDTIME   • [DISCONTINUED] azithromycin (Zithromax Z-Daniele) 250 MG tablet Take 2 tablets by mouth on day 1, then 1 tablet daily on days 2-5   • " "[DISCONTINUED] HYDROcodone-acetaminophen (NORCO) 5-325 MG per tablet    • [DISCONTINUED] ondansetron (Zofran) 4 MG tablet Take 1 tablet by mouth 4 (Four) Times a Day As Needed for Nausea or Vomiting.   • [DISCONTINUED] pantoprazole (Protonix) 40 MG EC tablet Take 1 tablet by mouth Daily.     No current facility-administered medications on file prior to visit.         Objective   /72   Pulse 55   Ht 182.9 cm (72\")   Wt 61.2 kg (135 lb)   BMI 18.31 kg/m²       Physical Exam  HENT:      Head: Normocephalic.   Neck:      Vascular: No carotid bruit.   Cardiovascular:      Rate and Rhythm: Normal rate and regular rhythm.      Pulses: Normal pulses.      Heart sounds: Normal heart sounds. No murmur heard.  Pulmonary:      Effort: Pulmonary effort is normal.      Breath sounds: Normal breath sounds.   Musculoskeletal:      Cervical back: Neck supple.      Right lower leg: No edema.      Left lower leg: No edema.   Skin:     General: Skin is dry.      Capillary Refill: Capillary refill takes less than 2 seconds.   Neurological:      Mental Status: He is alert and oriented to person, place, and time.   Psychiatric:         Behavior: Behavior normal.       Result Review :   The following data was reviewed by: RAZIA Cottrell on 11/07/2022:  proBNP   Date Value Ref Range Status   10/15/2021 579.3 0.0 - 900.0 pg/mL Final     CMP    CMP 8/2/22   Glucose 83   BUN 14   Creatinine 1.22   Sodium 137   Potassium 4.7   Chloride 102   Calcium 9.1   Albumin 4.10   Total Bilirubin 0.3   Alkaline Phosphatase 50   AST (SGOT) 26   ALT (SGPT) 18   (A) Abnormal value            CBC w/diff    CBC w/Diff 8/2/22   WBC 8.29   RBC 4.32   Hemoglobin 13.2   Hematocrit 39.0   MCV 90.3   MCH 30.6   MCHC 33.8   RDW 12.7   Platelets 263   Neutrophil Rel % 61.9   Immature Granulocyte Rel % 0.8 (A)   Lymphocyte Rel % 27.4   Monocyte Rel % 8.1   Eosinophil Rel % 1.6   Basophil Rel % 0.2   (A) Abnormal value             Lab Results "   Component Value Date    TSH 0.007 (L) 07/26/2022      Lab Results   Component Value Date    FREET4 1.41 07/26/2022      No results found for: DDIMERQUANT  Magnesium   Date Value Ref Range Status   08/02/2022 2.6 1.6 - 2.6 mg/dL Final      No results found for: DIGOXIN   Lab Results   Component Value Date    TROPONINT 8 08/02/2022           Results for orders placed in visit on 10/20/21    Adult Transthoracic Echo Complete W/ Cont if Necessary Per Protocol    Interpretation Summary  1.  Normal left ventricular systolic function.  2.  No significant valve abnormalities noted.         Assessment and Plan    Diagnoses and all orders for this visit:    1. CAD s/p CABG (Primary)  He has complaint of exertional symptoms but are brief and go away with rest since having Covid. He admits the symptoms are improving at time passes. I recommended to order a stress test and echocardiogram however he wants to hold off at this time. I told him if symptoms persist or worsen to let me know so that testing can be done if needed. He verbalizes understanding and agrees. Continue aspirin 81 mg daily and plavix 75 mg daily.    2. Hypertension, essential  Currently controlled and without adverse effects from medication, continue losartan 100 mg daily. Low sodium diet discussed.     3. Hyperlipemia, mixed  Last lipid panel was 10/04/21 with LDL of 43 which is within his goal range, continue rosuvastatin 20 mg nightly and repeat fasting lipid and hepatic function panel to make sure this dose continues to be effective for him.  -     Lipid Panel; Future  -     Hepatic Function Panel; Future          Follow Up   Return in about 6 months (around 5/7/2023) for Follow up with Dr Reeves.    Patient was given instructions and counseling regarding his condition or for health maintenance advice. Please see specific information pulled into the AVS if appropriate.     Gabriele Chau  reports that he has quit smoking. He has never used  smokeless tobacco.           Angie Burroughs, APRN  11/07/22  15:14 EST    Dictated Utilizing Dragon Dictation

## 2023-03-01 ENCOUNTER — APPOINTMENT (OUTPATIENT)
Dept: GENERAL RADIOLOGY | Facility: HOSPITAL | Age: 58
End: 2023-03-01
Payer: COMMERCIAL

## 2023-03-01 ENCOUNTER — TELEPHONE (OUTPATIENT)
Dept: CARDIOLOGY | Facility: CLINIC | Age: 58
End: 2023-03-01
Payer: COMMERCIAL

## 2023-03-01 ENCOUNTER — HOSPITAL ENCOUNTER (EMERGENCY)
Facility: HOSPITAL | Age: 58
Discharge: LEFT BEFORE DISCHARGE | End: 2023-03-01
Admitting: EMERGENCY MEDICINE
Payer: COMMERCIAL

## 2023-03-01 VITALS
RESPIRATION RATE: 20 BRPM | HEIGHT: 72 IN | WEIGHT: 134.04 LBS | TEMPERATURE: 98 F | HEART RATE: 68 BPM | SYSTOLIC BLOOD PRESSURE: 129 MMHG | DIASTOLIC BLOOD PRESSURE: 62 MMHG | BODY MASS INDEX: 18.16 KG/M2 | OXYGEN SATURATION: 99 %

## 2023-03-01 PROCEDURE — 99281 EMR DPT VST MAYX REQ PHY/QHP: CPT

## 2023-03-01 PROCEDURE — 71045 X-RAY EXAM CHEST 1 VIEW: CPT

## 2023-03-01 NOTE — TELEPHONE ENCOUNTER
Received a call from pt's pcp stated that the pat is a recent open heart patient and he lifted something today and got a burning sensation in chest, when he touched the area it caves in. I advised them to send him to the ER

## 2023-03-01 NOTE — ED PROVIDER NOTES
Time: 5:46 PM EST  Date of encounter:  3/1/2023  Independent Historian/Clinical History and Information was obtained by:     Chief Complaint   Patient presents with   • Numbness     Pt reports lifting something heavy two days ago and felt a numbness in his sternum afterward. Previous open heart sx. Area painful to touch       History is limited by:     History of Present Illness:  Patient is a 57 y.o. year old male who presents to the emergency department for evaluation of midsternal chest numbness that started day before yesterday after he picked up a battery.  He said when he picked it up it felt weird but now it is numb.  He says when he touches the wire in his chest that is left from his open heart surgery in 2021 he just about passed out due to pain.    HPI    Patient Care Team  Primary Care Provider: Carlo Hernandez MD    Past Medical History:     Allergies   Allergen Reactions   • Atorvastatin Diarrhea     Other reaction(s): Loose stools     • Xarelto [Rivaroxaban] Hives     Past Medical History:   Diagnosis Date   • CAD 8/19/2021   • High blood pressure    • Hyperlipidemia    • PVD (peripheral vascular disease) (HCC)      Past Surgical History:   Procedure Laterality Date   • EYE SURGERY     • ROTATOR CUFF REPAIR       Family History   Problem Relation Age of Onset   • Heart attack Father    • Hypertension Father    • Hypertension Paternal Uncle        Home Medications:  Prior to Admission medications    Medication Sig Start Date End Date Taking? Authorizing Provider   ASPIRIN 81 PO Take 81 mg by mouth Daily. 6/4/21   ProviderTamir MD   clopidogrel (PLAVIX) 75 MG tablet Take 1 tablet by mouth Daily. 3/29/22   Paxton Reeves MD   losartan (COZAAR) 100 MG tablet TAKE ONE TABLET BY MOUTH DAILY 3/28/22   Paxton Reeves MD   rosuvastatin (CRESTOR) 20 MG tablet TAKE ONE TABLET BY MOUTH EVERY NIGHT AT BEDTIME 4/29/22   Paxton Reeves MD        Social History:   Social History  "    Tobacco Use   • Smoking status: Former   • Smokeless tobacco: Never   Vaping Use   • Vaping Use: Never used   Substance Use Topics   • Alcohol use: Never   • Drug use: Never         Review of Systems:  Review of Systems   Constitutional: Negative for fever.   Respiratory: Negative for cough and shortness of breath.    Cardiovascular:        Chest wall pain and numbness   Neurological: Positive for numbness. Negative for dizziness.        Physical Exam:  /62   Pulse 68   Temp 98 °F (36.7 °C)   Resp 20   Ht 182.9 cm (72\")   Wt 60.8 kg (134 lb 0.6 oz)   SpO2 99%   BMI 18.18 kg/m²     Physical Exam  Constitutional:       General: He is not in acute distress.     Appearance: Normal appearance. He is not ill-appearing or toxic-appearing.   Cardiovascular:      Rate and Rhythm: Normal rate.   Pulmonary:      Effort: Pulmonary effort is normal. No respiratory distress.   Musculoskeletal:         General: Tenderness (Tenderness in chest wall) present.   Neurological:      General: No focal deficit present.      Mental Status: He is alert and oriented to person, place, and time.                  Procedures:  Procedures      Medical Decision Making:      Comorbidities that affect care:        External Notes reviewed:          The following orders were placed and all results were independently analyzed by me:  Orders Placed This Encounter   Procedures   • XR Chest 1 View       Medications Given in the Emergency Department:  Medications - No data to display     ED Course:    The patient was initially evaluated in the triage area where orders were placed. The patient was later dispositioned by RAZIA Howard.      The patient was advised to stay for completion of workup which includes but is not limited to communication of labs and radiological results, reassessment and plan. The patient was advised that leaving prior to disposition by a provider could result in critical findings that are not communicated to " the patient.          Labs:    Lab Results (last 24 hours)     ** No results found for the last 24 hours. **           Imaging:    No Radiology Exams Resulted Within Past 24 Hours      Differential Diagnosis and Discussion:              MDM         Patient Care Considerations:          Consultants/Shared Management Plan:        Social Determinants of Health:          Disposition and Care Coordination:          Final diagnoses:   None        ED Disposition     ED Disposition   AMA    Condition   --    Comment   --             This medical record created using voice recognition software.           Tania Christy, RAZIA  03/06/23 [id number]

## 2023-04-04 RX ORDER — LOSARTAN POTASSIUM 100 MG/1
TABLET ORAL
Qty: 90 TABLET | Refills: 3 | Status: SHIPPED | OUTPATIENT
Start: 2023-04-04

## 2023-05-29 NOTE — PROGRESS NOTES
[facility]  Cardiology progress Note    Patient Name: Gabriele Chau  : 1965    CHIEF COMPLAINT  Coronary artery disease        Subjective   Subjective     HISTORY OF PRESENT ILLNESS    Gabriele Chau is a 57 y.o. male with coronary artery s/p CABG. complains of musculoskeletal chest pain secondary to his wires postsurgery.    REVIEW OF SYSTEMS    Constitutional:    No fever, no weight loss  Skin:     No rash  Otolaryngeal:    No difficulty swallowing  Cardiovascular: See HPI.  Pulmonary:    No cough, no sputum production    Personal History     Social History:    reports that he has quit smoking. He has never used smokeless tobacco. He reports that he does not drink alcohol and does not use drugs.    Home Medications:  Current Outpatient Medications on File Prior to Visit   Medication Sig   • ASPIRIN 81 PO Take 81 mg by mouth Daily.   • clopidogrel (PLAVIX) 75 MG tablet Take 1 tablet by mouth Daily.   • losartan (COZAAR) 100 MG tablet TAKE 1 TABLET BY MOUTH DAILY   • rosuvastatin (CRESTOR) 20 MG tablet TAKE ONE TABLET BY MOUTH EVERY NIGHT AT BEDTIME (Patient not taking: Reported on 2023)     No current facility-administered medications on file prior to visit.       Past Medical History:   Diagnosis Date   • CAD 2021   • High blood pressure    • Hyperlipidemia    • PVD (peripheral vascular disease)        Allergies:  Allergies   Allergen Reactions   • Atorvastatin Diarrhea     Other reaction(s): Loose stools     • Xarelto [Rivaroxaban] Hives       Objective    Objective       Vitals:   Heart Rate:  [52] 52  BP: (140)/(66) 140/66  Body mass index is 18.17 kg/m².     PHYSICAL EXAM:    General Appearance:   · well developed  · well nourished  HENT:   · oropharynx moist  · lips not cyanotic  Neck:  · thyroid not enlarged  · supple  Respiratory:  · no respiratory distress  · normal breath sounds  · no rales  Cardiovascular:  · no jugular venous distention  · regular  rhythm  · apical impulse normal  · S1 normal, S2 normal  · no S3, no S4   · no murmur  · no rub, no thrill  · carotid pulses normal; no bruit  · pedal pulses normal  · lower extremity edema: none    Skin:   · warm, dry  Psychiatric:  · judgement and insight appropriate  · normal mood and affect        Result Review:  I have personally reviewed the available results from  [x]  Laboratory  [x]  EKG  [x]  Cardiology  [x]  Medications  [x]  Old records  []  Other:     Procedures  Lab Results   Component Value Date    CHOL 104 10/04/2021     Lab Results   Component Value Date    TRIG 47 10/04/2021    TRIG 61 01/24/2020     Lab Results   Component Value Date    HDL 49 10/04/2021    HDL 49 01/24/2020     Lab Results   Component Value Date    LDL 43 10/04/2021     (H) 01/24/2020     Lab Results   Component Value Date    VLDL 12 10/04/2021    VLDL 12 01/24/2020     Results for orders placed in visit on 10/20/21    Adult Transthoracic Echo Complete W/ Cont if Necessary Per Protocol    Interpretation Summary  1.  Normal left ventricular systolic function.  2.  No significant valve abnormalities noted.     Impression/Plan:  1.  Coronary disease s/p CABG stable: No anginal symptoms. continue Plavix 75 mg once a day.  2.  Essential hypertension controlled: Continue Cozaar 100 mg once a day.  Blood pressure controlled at home.  3.  Hyperlipidemia: Continue Crestor 20 mg once a day.  Monitor lipid and hepatic profile.           Paxton Reeves MD   05/30/23   14:31 EDT

## 2023-05-30 ENCOUNTER — OFFICE VISIT (OUTPATIENT)
Dept: CARDIOLOGY | Facility: CLINIC | Age: 58
End: 2023-05-30

## 2023-05-30 VITALS
HEART RATE: 52 BPM | WEIGHT: 134 LBS | HEIGHT: 72 IN | DIASTOLIC BLOOD PRESSURE: 66 MMHG | SYSTOLIC BLOOD PRESSURE: 140 MMHG | BODY MASS INDEX: 18.15 KG/M2

## 2023-05-30 DIAGNOSIS — E78.2 HYPERLIPEMIA, MIXED: ICD-10-CM

## 2023-05-30 DIAGNOSIS — I25.10 CORONARY ARTERY DISEASE INVOLVING NATIVE CORONARY ARTERY OF NATIVE HEART WITHOUT ANGINA PECTORIS: Primary | ICD-10-CM

## 2023-05-30 DIAGNOSIS — I10 HYPERTENSION, ESSENTIAL: ICD-10-CM

## 2023-05-30 PROCEDURE — 99214 OFFICE O/P EST MOD 30 MIN: CPT | Performed by: SPECIALIST

## 2023-05-30 RX ORDER — ROSUVASTATIN CALCIUM 20 MG/1
20 TABLET, COATED ORAL
Qty: 90 TABLET | Refills: 1 | Status: SHIPPED | OUTPATIENT
Start: 2023-05-30

## 2023-11-30 ENCOUNTER — OFFICE VISIT (OUTPATIENT)
Dept: CARDIOLOGY | Facility: CLINIC | Age: 58
End: 2023-11-30
Payer: COMMERCIAL

## 2023-11-30 VITALS
DIASTOLIC BLOOD PRESSURE: 59 MMHG | SYSTOLIC BLOOD PRESSURE: 113 MMHG | HEIGHT: 72 IN | WEIGHT: 133 LBS | BODY MASS INDEX: 18.01 KG/M2 | HEART RATE: 51 BPM

## 2023-11-30 DIAGNOSIS — E78.2 HYPERLIPEMIA, MIXED: ICD-10-CM

## 2023-11-30 DIAGNOSIS — I25.10 CORONARY ARTERY DISEASE INVOLVING NATIVE CORONARY ARTERY OF NATIVE HEART WITHOUT ANGINA PECTORIS: Primary | ICD-10-CM

## 2023-11-30 DIAGNOSIS — I10 HYPERTENSION, ESSENTIAL: ICD-10-CM

## 2023-11-30 NOTE — PROGRESS NOTES
Chief Complaint  Follow-up and Coronary Artery Disease    Subjective            History of Present Illness  Gabriele Chau is a 57-year-old male patient who presents to the office today for follow-up.  He has CAD with prior CABG, hypertension, and hyperlipidemia.  He reports occasional pinching sensation that occurs in the chest with certain position changes.  He only experiences shortness of breath with strenuous activity which has been made worse by his episode of COVID that occurred a year ago.  He stopped his Crestor medication about a month ago due to the fact he felt like he did not need to be on it.    PMH  Past Medical History:   Diagnosis Date    CAD 8/19/2021    High blood pressure     Hyperlipidemia     PVD (peripheral vascular disease)          ALLERGY  Allergies   Allergen Reactions    Atorvastatin Diarrhea     Other reaction(s): Loose stools      Xarelto [Rivaroxaban] Hives          SURGICALHX  Past Surgical History:   Procedure Laterality Date    EYE SURGERY      ROTATOR CUFF REPAIR            SOC  Social History     Socioeconomic History    Marital status:    Tobacco Use    Smoking status: Former    Smokeless tobacco: Never   Vaping Use    Vaping Use: Never used   Substance and Sexual Activity    Alcohol use: Never    Drug use: Never    Sexual activity: Defer         FAMHX  Family History   Problem Relation Age of Onset    Heart attack Father     Hypertension Father     Hypertension Paternal Uncle           MEDSIGONLY  Current Outpatient Medications on File Prior to Visit   Medication Sig    ASPIRIN 81 PO Take 81 mg by mouth Daily.    losartan (COZAAR) 100 MG tablet TAKE 1 TABLET BY MOUTH DAILY    [DISCONTINUED] lidocaine (Lidoderm) 5 % Place 2 patches on the skin as directed by provider Daily. Remove & Discard patch within 12 hours or as directed by MD (Patient not taking: Reported on 11/30/2023)    [DISCONTINUED] rosuvastatin (CRESTOR) 20 MG tablet Take 1 tablet by mouth every night at  "bedtime. (Patient not taking: Reported on 11/30/2023)     No current facility-administered medications on file prior to visit.       Objective   /59   Pulse 51   Ht 182.9 cm (72\")   Wt 60.3 kg (133 lb)   BMI 18.04 kg/m²       Physical Exam  HENT:      Head: Normocephalic.   Neck:      Vascular: No carotid bruit.   Cardiovascular:      Rate and Rhythm: Regular rhythm. Bradycardia present.      Pulses: Normal pulses.      Heart sounds: Normal heart sounds. No murmur heard.  Pulmonary:      Effort: Pulmonary effort is normal.      Breath sounds: Normal breath sounds.   Musculoskeletal:      Cervical back: Neck supple.      Right lower leg: No edema.      Left lower leg: No edema.   Skin:     General: Skin is dry.   Neurological:      Mental Status: He is alert and oriented to person, place, and time.   Psychiatric:         Behavior: Behavior normal.       Result Review :   The following data was reviewed by: RAZIA Cottrell on 11/30/2023:  proBNP   Date Value Ref Range Status   10/15/2021 579.3 0.0 - 900.0 pg/mL Final        Lab Results   Component Value Date    TSH 0.007 (L) 07/26/2022      Lab Results   Component Value Date    FREET4 1.41 07/26/2022      No results found for: \"DDIMERQUANT\"  Magnesium   Date Value Ref Range Status   08/02/2022 2.6 1.6 - 2.6 mg/dL Final      No results found for: \"DIGOXIN\"   Lab Results   Component Value Date    TROPONINT 8 08/02/2022           Results for orders placed in visit on 10/20/21    Adult Transthoracic Echo Complete W/ Cont if Necessary Per Protocol    Interpretation Summary  1.  Normal left ventricular systolic function.  2.  No significant valve abnormalities noted.         Assessment and Plan    Diagnoses and all orders for this visit:    1. CAD s/p CABG (Primary)  He denies any anginal symptoms, continue aspirin 81 mg daily.    2. Hypertension, essential  Currently controlled and without adverse effects from medication, continue losartan 100 mg " daily.    3. Hyperlipemia, mixed  He is no longer on statin therapy after taking himself off of it.  Repeat fasting lipid and hepatic function panel to assess need for statin therapy.  We talked about ways to change diet to lower lipid levels.  -     Lipid Panel; Future  -     Hepatic Function Panel; Future            Follow Up   Return in about 6 months (around 5/30/2024) for Follow up with Dr Reeves.    Patient was given instructions and counseling regarding his condition or for health maintenance advice. Please see specific information pulled into the AVS if appropriate.     Gabriele Chau  reports that he has quit smoking. He has never used smokeless tobacco.           Angie Burroughs, APRN  11/30/23  15:23 EST    Dictated Utilizing Dragon Dictation

## 2023-12-15 ENCOUNTER — CLINICAL SUPPORT (OUTPATIENT)
Dept: FAMILY MEDICINE CLINIC | Facility: CLINIC | Age: 58
End: 2023-12-15
Payer: COMMERCIAL

## 2023-12-15 DIAGNOSIS — E78.2 HYPERLIPEMIA, MIXED: ICD-10-CM

## 2023-12-15 LAB
ALBUMIN SERPL-MCNC: 3.8 G/DL (ref 3.5–5.2)
ALP SERPL-CCNC: 45 U/L (ref 39–117)
ALT SERPL W P-5'-P-CCNC: 13 U/L (ref 1–41)
AST SERPL-CCNC: 18 U/L (ref 1–40)
BILIRUB CONJ SERPL-MCNC: <0.2 MG/DL (ref 0–0.3)
BILIRUB INDIRECT SERPL-MCNC: NORMAL MG/DL
BILIRUB SERPL-MCNC: 0.3 MG/DL (ref 0–1.2)
CHOLEST SERPL-MCNC: 135 MG/DL (ref 0–200)
HDLC SERPL-MCNC: 40 MG/DL (ref 40–60)
LDLC SERPL CALC-MCNC: 83 MG/DL (ref 0–100)
LDLC/HDLC SERPL: 2.11 {RATIO}
PROT SERPL-MCNC: 6.4 G/DL (ref 6–8.5)
TRIGL SERPL-MCNC: 53 MG/DL (ref 0–150)
VLDLC SERPL-MCNC: 12 MG/DL (ref 5–40)

## 2023-12-15 PROCEDURE — 80061 LIPID PANEL: CPT | Performed by: NURSE PRACTITIONER

## 2023-12-15 PROCEDURE — 80076 HEPATIC FUNCTION PANEL: CPT | Performed by: NURSE PRACTITIONER

## 2023-12-15 PROCEDURE — 36415 COLL VENOUS BLD VENIPUNCTURE: CPT | Performed by: FAMILY MEDICINE

## 2023-12-15 NOTE — PROGRESS NOTES
Venipuncture Blood Specimen Collection  Venipuncture performed in left arm by Yuli Trujillo with good hemostasis. Patient tolerated the procedure well without complications.   12/15/23   Yuli Trujillo

## 2023-12-18 ENCOUNTER — TELEPHONE (OUTPATIENT)
Dept: CARDIOLOGY | Facility: CLINIC | Age: 58
End: 2023-12-18
Payer: COMMERCIAL

## 2023-12-18 DIAGNOSIS — I10 HYPERTENSION, ESSENTIAL: Primary | ICD-10-CM

## 2023-12-18 DIAGNOSIS — E78.2 HYPERLIPEMIA, MIXED: ICD-10-CM

## 2023-12-18 RX ORDER — LOVASTATIN 10 MG/1
10 TABLET ORAL NIGHTLY
Qty: 90 TABLET | Refills: 3 | Status: SHIPPED | OUTPATIENT
Start: 2023-12-18

## 2023-12-18 NOTE — TELEPHONE ENCOUNTER
----- Message from RAZAI Collins sent at 12/18/2023  8:09 AM EST -----  LDL is not to goal range of less than 70, would recommend statin therapy, see if patient is agreeable

## 2024-04-22 RX ORDER — LOSARTAN POTASSIUM 100 MG/1
TABLET ORAL
Qty: 90 TABLET | Refills: 3 | Status: SHIPPED | OUTPATIENT
Start: 2024-04-22

## 2024-05-01 RX ORDER — LOSARTAN POTASSIUM 100 MG/1
TABLET ORAL
Qty: 90 TABLET | Refills: 3 | OUTPATIENT
Start: 2024-05-01

## 2024-05-28 ENCOUNTER — OFFICE VISIT (OUTPATIENT)
Dept: FAMILY MEDICINE CLINIC | Facility: CLINIC | Age: 59
End: 2024-05-28
Payer: COMMERCIAL

## 2024-05-28 VITALS
SYSTOLIC BLOOD PRESSURE: 150 MMHG | OXYGEN SATURATION: 99 % | DIASTOLIC BLOOD PRESSURE: 90 MMHG | WEIGHT: 134.6 LBS | HEART RATE: 69 BPM | TEMPERATURE: 98.6 F | HEIGHT: 72 IN | BODY MASS INDEX: 18.23 KG/M2

## 2024-05-28 DIAGNOSIS — E78.2 HYPERLIPEMIA, MIXED: ICD-10-CM

## 2024-05-28 DIAGNOSIS — Z12.5 SCREENING PSA (PROSTATE SPECIFIC ANTIGEN): ICD-10-CM

## 2024-05-28 DIAGNOSIS — M79.641 RIGHT HAND PAIN: Primary | ICD-10-CM

## 2024-05-28 DIAGNOSIS — F41.9 ANXIETY AND DEPRESSION: ICD-10-CM

## 2024-05-28 DIAGNOSIS — M54.50 ACUTE RIGHT-SIDED LOW BACK PAIN WITHOUT SCIATICA: ICD-10-CM

## 2024-05-28 DIAGNOSIS — R06.02 SHORTNESS OF BREATH: ICD-10-CM

## 2024-05-28 DIAGNOSIS — Z23 NEED FOR TDAP VACCINATION: ICD-10-CM

## 2024-05-28 DIAGNOSIS — F32.A ANXIETY AND DEPRESSION: ICD-10-CM

## 2024-05-28 DIAGNOSIS — Z12.2 SCREENING FOR LUNG CANCER: ICD-10-CM

## 2024-05-28 LAB
ALBUMIN SERPL-MCNC: 4.4 G/DL (ref 3.5–5.2)
ALBUMIN SERPL-MCNC: 4.5 G/DL (ref 3.5–5.2)
ALBUMIN/GLOB SERPL: 1.5 G/DL
ALP SERPL-CCNC: 68 U/L (ref 39–117)
ALP SERPL-CCNC: 70 U/L (ref 39–117)
ALT SERPL W P-5'-P-CCNC: 11 U/L (ref 1–41)
ALT SERPL W P-5'-P-CCNC: 12 U/L (ref 1–41)
ANION GAP SERPL CALCULATED.3IONS-SCNC: 9 MMOL/L (ref 5–15)
AST SERPL-CCNC: 13 U/L (ref 1–40)
AST SERPL-CCNC: 16 U/L (ref 1–40)
BASOPHILS # BLD AUTO: 0.07 10*3/MM3 (ref 0–0.2)
BASOPHILS NFR BLD AUTO: 0.7 % (ref 0–1.5)
BILIRUB BLD-MCNC: NEGATIVE MG/DL
BILIRUB CONJ SERPL-MCNC: <0.2 MG/DL (ref 0–0.3)
BILIRUB INDIRECT SERPL-MCNC: NORMAL MG/DL
BILIRUB SERPL-MCNC: 0.4 MG/DL (ref 0–1.2)
BILIRUB SERPL-MCNC: 0.4 MG/DL (ref 0–1.2)
BUN SERPL-MCNC: 19 MG/DL (ref 8–23)
BUN/CREAT SERPL: 14.7 (ref 7–25)
CALCIUM SPEC-SCNC: 10 MG/DL (ref 8.6–10.5)
CHLORIDE SERPL-SCNC: 101 MMOL/L (ref 98–107)
CHOLEST SERPL-MCNC: 155 MG/DL (ref 0–200)
CLARITY, POC: CLEAR
CO2 SERPL-SCNC: 30 MMOL/L (ref 22–29)
COLOR UR: YELLOW
CREAT SERPL-MCNC: 1.29 MG/DL (ref 0.76–1.27)
DEPRECATED RDW RBC AUTO: 44.6 FL (ref 37–54)
EGFRCR SERPLBLD CKD-EPI 2021: 63.5 ML/MIN/1.73
EOSINOPHIL # BLD AUTO: 0.17 10*3/MM3 (ref 0–0.4)
EOSINOPHIL NFR BLD AUTO: 1.7 % (ref 0.3–6.2)
ERYTHROCYTE [DISTWIDTH] IN BLOOD BY AUTOMATED COUNT: 12.5 % (ref 12.3–15.4)
EXPIRATION DATE: ABNORMAL
GLOBULIN UR ELPH-MCNC: 3.1 GM/DL
GLUCOSE SERPL-MCNC: 87 MG/DL (ref 65–99)
GLUCOSE UR STRIP-MCNC: NEGATIVE MG/DL
HCT VFR BLD AUTO: 43.1 % (ref 37.5–51)
HDLC SERPL-MCNC: 40 MG/DL (ref 40–60)
HGB BLD-MCNC: 14.2 G/DL (ref 13–17.7)
IMM GRANULOCYTES # BLD AUTO: 0.03 10*3/MM3 (ref 0–0.05)
IMM GRANULOCYTES NFR BLD AUTO: 0.3 % (ref 0–0.5)
KETONES UR QL: NEGATIVE
LDLC SERPL CALC-MCNC: 91 MG/DL (ref 0–100)
LDLC/HDLC SERPL: 2.22 {RATIO}
LEUKOCYTE EST, POC: NEGATIVE
LYMPHOCYTES # BLD AUTO: 1.88 10*3/MM3 (ref 0.7–3.1)
LYMPHOCYTES NFR BLD AUTO: 18.3 % (ref 19.6–45.3)
Lab: ABNORMAL
MCH RBC QN AUTO: 31.6 PG (ref 26.6–33)
MCHC RBC AUTO-ENTMCNC: 32.9 G/DL (ref 31.5–35.7)
MCV RBC AUTO: 96 FL (ref 79–97)
MONOCYTES # BLD AUTO: 0.82 10*3/MM3 (ref 0.1–0.9)
MONOCYTES NFR BLD AUTO: 8 % (ref 5–12)
NEUTROPHILS NFR BLD AUTO: 7.3 10*3/MM3 (ref 1.7–7)
NEUTROPHILS NFR BLD AUTO: 71 % (ref 42.7–76)
NITRITE UR-MCNC: NEGATIVE MG/ML
NRBC BLD AUTO-RTO: 0 /100 WBC (ref 0–0.2)
NT-PROBNP SERPL-MCNC: 603 PG/ML (ref 0–900)
PH UR: 7 [PH] (ref 5–8)
PLATELET # BLD AUTO: 260 10*3/MM3 (ref 140–450)
PMV BLD AUTO: 10.8 FL (ref 6–12)
POTASSIUM SERPL-SCNC: 4.8 MMOL/L (ref 3.5–5.2)
PROT SERPL-MCNC: 7.4 G/DL (ref 6–8.5)
PROT SERPL-MCNC: 7.6 G/DL (ref 6–8.5)
PROT UR STRIP-MCNC: NEGATIVE MG/DL
PSA SERPL-MCNC: 1.65 NG/ML (ref 0–4)
RBC # BLD AUTO: 4.49 10*6/MM3 (ref 4.14–5.8)
RBC # UR STRIP: ABNORMAL /UL
SODIUM SERPL-SCNC: 140 MMOL/L (ref 136–145)
SP GR UR: 1 (ref 1–1.03)
TRIGL SERPL-MCNC: 132 MG/DL (ref 0–150)
UROBILINOGEN UR QL: ABNORMAL
VLDLC SERPL-MCNC: 24 MG/DL (ref 5–40)
WBC NRBC COR # BLD AUTO: 10.27 10*3/MM3 (ref 3.4–10.8)

## 2024-05-28 PROCEDURE — 90715 TDAP VACCINE 7 YRS/> IM: CPT | Performed by: FAMILY MEDICINE

## 2024-05-28 PROCEDURE — 99214 OFFICE O/P EST MOD 30 MIN: CPT | Performed by: FAMILY MEDICINE

## 2024-05-28 PROCEDURE — 1160F RVW MEDS BY RX/DR IN RCRD: CPT | Performed by: FAMILY MEDICINE

## 2024-05-28 PROCEDURE — 82248 BILIRUBIN DIRECT: CPT | Performed by: FAMILY MEDICINE

## 2024-05-28 PROCEDURE — 85025 COMPLETE CBC W/AUTO DIFF WBC: CPT | Performed by: FAMILY MEDICINE

## 2024-05-28 PROCEDURE — 90471 IMMUNIZATION ADMIN: CPT | Performed by: FAMILY MEDICINE

## 2024-05-28 PROCEDURE — 80061 LIPID PANEL: CPT | Performed by: SPECIALIST

## 2024-05-28 PROCEDURE — G0103 PSA SCREENING: HCPCS | Performed by: FAMILY MEDICINE

## 2024-05-28 PROCEDURE — 83880 ASSAY OF NATRIURETIC PEPTIDE: CPT | Performed by: FAMILY MEDICINE

## 2024-05-28 PROCEDURE — 80053 COMPREHEN METABOLIC PANEL: CPT | Performed by: FAMILY MEDICINE

## 2024-05-28 PROCEDURE — 3080F DIAST BP >= 90 MM HG: CPT | Performed by: FAMILY MEDICINE

## 2024-05-28 PROCEDURE — 1159F MED LIST DOCD IN RCRD: CPT | Performed by: FAMILY MEDICINE

## 2024-05-28 PROCEDURE — 3077F SYST BP >= 140 MM HG: CPT | Performed by: FAMILY MEDICINE

## 2024-05-28 RX ORDER — LIDOCAINE 50 MG/G
2 PATCH TOPICAL EVERY 24 HOURS
Qty: 60 PATCH | Refills: 1 | Status: SHIPPED | OUTPATIENT
Start: 2024-05-28

## 2024-05-28 RX ORDER — LOSARTAN POTASSIUM 100 MG/1
TABLET ORAL
Qty: 90 TABLET | Refills: 3 | OUTPATIENT
Start: 2024-05-28

## 2024-05-28 RX ORDER — MELOXICAM 15 MG/1
15 TABLET ORAL DAILY
Qty: 10 TABLET | Refills: 0 | Status: SHIPPED | OUTPATIENT
Start: 2024-05-28

## 2024-05-28 RX ORDER — ALBUTEROL SULFATE 90 UG/1
2 AEROSOL, METERED RESPIRATORY (INHALATION) EVERY 4 HOURS PRN
Qty: 18 G | Refills: 1 | Status: SHIPPED | OUTPATIENT
Start: 2024-05-28

## 2024-05-28 NOTE — PROGRESS NOTES
Chief Complaint  Annual Exam, Abdominal Pain (Left side), Back Pain, and Shortness of Breath    Subjective          Gabriele Chau presents to [facility]  History of Present Illness  Right hand pain at base of thumb x 8 months after grabbing gallon of milk out refrigerator- has had pain at base of thumb since then    Pt has depression- refuses to see psychiatry- no SI or HI- pt agrees to go straight to ER if he has any SI or HI- pt feels safe currently- will start zoloft and get gene test    Pt has had soa x 1 yr- getting worse    Pt has been out of HTN med- will restart today- cardiology taking care of this and sent it to pharmacy in April- pharmacy had not let pt know med was there  Back Pain  This is a new problem. The current episode started in the past 7 days. The problem occurs constantly. The problem is unchanged. The pain is present in the lumbar spine. The quality of the pain is described as aching. Radiates to: right flank. The pain is moderate. The symptoms are aggravated by bending, standing, sitting and twisting. Pertinent negatives include no abdominal pain, bladder incontinence, bowel incontinence, chest pain, dysuria, fever, headaches, paresis, pelvic pain or perianal numbness. Treatments tried: tylenol, ibuprofen. The treatment provided no relief.              Objective   Allergies   Allergen Reactions    Atorvastatin Diarrhea     Other reaction(s): Loose stools      Xarelto [Rivaroxaban] Hives     Immunization History   Administered Date(s) Administered    COVID-19 (PFIZER) Purple Cap Monovalent 07/01/2021, 07/22/2021    Influenza, Unspecified 01/23/2020    Tdap 05/28/2024     Past Medical History:   Diagnosis Date    CAD 8/19/2021    High blood pressure     Hyperlipidemia     PVD (peripheral vascular disease)       Past Surgical History:   Procedure Laterality Date    EYE SURGERY      ROTATOR CUFF REPAIR        Social History     Socioeconomic History     "Marital status:    Tobacco Use    Smoking status: Former     Current packs/day: 0.00     Average packs/day: 1 pack/day for 1 year (1.0 ttl pk-yrs)     Types: Cigarettes     Start date:      Quit date:      Years since quittin.4    Smokeless tobacco: Never   Vaping Use    Vaping status: Never Used   Substance and Sexual Activity    Alcohol use: Never    Drug use: Never    Sexual activity: Defer        Current Outpatient Medications:     ASPIRIN 81 PO, Take 81 mg by mouth Daily., Disp: , Rfl:     lovastatin (MEVACOR) 10 MG tablet, Take 1 tablet by mouth Every Night., Disp: 90 tablet, Rfl: 3    albuterol sulfate  (90 Base) MCG/ACT inhaler, Inhale 2 puffs Every 4 (Four) Hours As Needed for Wheezing., Disp: 18 g, Rfl: 1    Elastic Bandages & Supports (Wrist/Thumb Splint/Right XL) misc, Use 1 each Daily., Disp: 1 each, Rfl: 0    lidocaine (Lidoderm) 5 %, Place 2 patches on the skin as directed by provider Daily. Remove & Discard patch within 12 hours or as directed by MD, Disp: 60 patch, Rfl: 1    losartan (COZAAR) 100 MG tablet, TAKE 1 TABLET BY MOUTH DAILY (Patient not taking: Reported on 2024), Disp: 90 tablet, Rfl: 3    meloxicam (Mobic) 15 MG tablet, Take 1 tablet by mouth Daily., Disp: 10 tablet, Rfl: 0    sertraline (Zoloft) 50 MG tablet, Take 1 tablet by mouth Daily., Disp: 30 tablet, Rfl: 1   Family History   Problem Relation Age of Onset    Heart attack Father     Hypertension Father     Hypertension Paternal Uncle           Vital Signs:   Vitals:    24 1000   BP: 150/90   Pulse: 69   Temp: 98.6 °F (37 °C)   SpO2: 99%   Weight: 61.1 kg (134 lb 9.6 oz)   Height: 182.9 cm (72\")       Review of Systems   Constitutional:  Negative for fatigue and fever.   HENT:  Negative for sore throat.    Eyes:  Negative for visual disturbance.   Respiratory:  Positive for cough and shortness of breath. Negative for chest tightness and wheezing.    Cardiovascular:  Negative for chest pain, " palpitations and leg swelling.   Gastrointestinal:  Negative for abdominal pain, bowel incontinence, diarrhea, nausea and vomiting.   Genitourinary:  Negative for bladder incontinence, dysuria and pelvic pain.   Musculoskeletal:  Positive for back pain.   Neurological:  Negative for headaches.      Physical Exam  Vitals reviewed.   Constitutional:       Appearance: Normal appearance. He is well-developed.   HENT:      Head: Normocephalic and atraumatic.      Right Ear: External ear normal.      Left Ear: External ear normal.      Mouth/Throat:      Pharynx: No oropharyngeal exudate.   Eyes:      Conjunctiva/sclera: Conjunctivae normal.      Pupils: Pupils are equal, round, and reactive to light.   Cardiovascular:      Rate and Rhythm: Normal rate and regular rhythm.      Pulses: Normal pulses.      Heart sounds: Normal heart sounds. No murmur heard.     No friction rub. No gallop.   Pulmonary:      Effort: Pulmonary effort is normal.      Breath sounds: Normal breath sounds. No wheezing or rhonchi.   Abdominal:      General: Abdomen is flat. Bowel sounds are normal. There is no distension.      Palpations: Abdomen is soft. There is no mass.      Tenderness: There is no abdominal tenderness. There is no right CVA tenderness, left CVA tenderness, guarding or rebound.      Hernia: No hernia is present.   Musculoskeletal:         General: Normal range of motion.      Comments: Right lower back paraspinal muscle tenderness, neg straight leg raise, no vertebrae tenderness, no redness, warmth, swelling, or bruising.    Right hand tenderness at base of thumb only, normal ROM< stable, no redness, warmth, swelling, or bruising.     Skin:     General: Skin is warm and dry.      Capillary Refill: Capillary refill takes less than 2 seconds.   Neurological:      General: No focal deficit present.      Mental Status: He is alert and oriented to person, place, and time.      Cranial Nerves: No cranial nerve deficit.   Psychiatric:          Mood and Affect: Mood and affect normal.         Behavior: Behavior normal.         Thought Content: Thought content normal.         Judgment: Judgment normal.        Result Review :   The following data was reviewed by: Carlo Hernandez MD on 05/28/2024:  CMP          12/15/2023    09:18   CMP   Total Protein 6.4    Albumin 3.8    Total Bilirubin 0.3    Alkaline Phosphatase 45    AST (SGOT) 18    ALT (SGPT) 13        Lipid Panel          12/15/2023    09:18   Lipid Panel   Total Cholesterol 135    Triglycerides 53    HDL Cholesterol 40    VLDL Cholesterol 12    LDL Cholesterol  83    LDL/HDL Ratio 2.11          Data reviewed : Radiologic studies I viewed and interpreted 2 views chest x-rays, 3 views right hand x-rays, 2 views lumbar spine x-rays:no fxs, NAD.           Assessment and Plan    Diagnoses and all orders for this visit:    1. Right hand pain (Primary)  -     XR Hand 3+ View Right (In Office)  -     Elastic Bandages & Supports (Wrist/Thumb Splint/Right XL) misc; Use 1 each Daily.  Dispense: 1 each; Refill: 0  -     meloxicam (Mobic) 15 MG tablet; Take 1 tablet by mouth Daily.  Dispense: 10 tablet; Refill: 0  -     Ambulatory Referral to Hand Surgery    2. Anxiety and depression  -     [facility] - Swab,  -     sertraline (Zoloft) 50 MG tablet; Take 1 tablet by mouth Daily.  Dispense: 30 tablet; Refill: 1    3. Shortness of breath  -     XR Chest PA & Lateral (In Office)  -     BNP  -     Complete PFT - Pre & Post Bronchodilator; Future  -     albuterol sulfate  (90 Base) MCG/ACT inhaler; Inhale 2 puffs Every 4 (Four) Hours As Needed for Wheezing.  Dispense: 18 g; Refill: 1    4. Acute right-sided low back pain without sciatica  -     XR Spine Lumbar 2 or 3 View (In Office)  -     CBC Auto Differential  -     Comprehensive Metabolic Panel  -     POCT urinalysis dipstick, automated  -     meloxicam (Mobic) 15 MG tablet; Take 1 tablet by mouth Daily.  Dispense: 10 tablet; Refill: 0  -      lidocaine (Lidoderm) 5 %; Place 2 patches on the skin as directed by provider Daily. Remove & Discard patch within 12 hours or as directed by MD  Dispense: 60 patch; Refill: 1    5. Screening for lung cancer  -      CT Chest Low Dose Cancer Screening WO; Future    6. Need for Tdap vaccination  -     Tdap Vaccine => 6yo IM (BOOSTRIX)    7. Screening PSA (prostate specific antigen)  -     PSA Screen    8. Hyperlipemia, mixed  -     Lipid Panel  -     Hepatic Function Panel  -     Cancel: Lipid Panel  -     Cancel: Hepatic Function Panel            Follow Up   Return in about 13 days (around 6/10/2024) for Recheck.  Patient was given instructions and counseling regarding his condition or for health maintenance advice. Please see specific information pulled into the AVS if appropriate.     Pt placed in thumb spica splint.

## 2024-05-28 NOTE — PROGRESS NOTES
Venipuncture Blood Specimen Collection  Venipuncture performed in [place] by Tiffany Wheeler MA with good hemostasis. Patient tolerated the procedure well without complications.   05/28/24   Tiffany Wheeler MA

## 2024-06-06 DIAGNOSIS — M79.641 RIGHT HAND PAIN: ICD-10-CM

## 2024-06-06 DIAGNOSIS — M54.50 ACUTE RIGHT-SIDED LOW BACK PAIN WITHOUT SCIATICA: ICD-10-CM

## 2024-06-06 RX ORDER — MELOXICAM 15 MG/1
15 TABLET ORAL DAILY
Qty: 10 TABLET | Refills: 0 | Status: SHIPPED | OUTPATIENT
Start: 2024-06-06

## 2024-06-06 RX ORDER — LOSARTAN POTASSIUM 100 MG/1
TABLET ORAL
Qty: 90 TABLET | Refills: 0 | Status: SHIPPED | OUTPATIENT
Start: 2024-06-06

## 2024-06-06 NOTE — TELEPHONE ENCOUNTER
PT CALLED REQUESTING LOSARTAN REFILL. CHOLO FERNANDEZ.  WAS ADVISED LAST SCRIPT WAS CLOSED OUT AND UNSURE WHY.  RESENT TO REBECCA

## 2024-06-10 ENCOUNTER — OFFICE VISIT (OUTPATIENT)
Dept: FAMILY MEDICINE CLINIC | Facility: CLINIC | Age: 59
End: 2024-06-10
Payer: COMMERCIAL

## 2024-06-10 VITALS
DIASTOLIC BLOOD PRESSURE: 90 MMHG | HEART RATE: 55 BPM | SYSTOLIC BLOOD PRESSURE: 148 MMHG | OXYGEN SATURATION: 99 % | WEIGHT: 137.9 LBS | TEMPERATURE: 97.4 F | BODY MASS INDEX: 18.7 KG/M2

## 2024-06-10 DIAGNOSIS — I10 PRIMARY HYPERTENSION: Primary | ICD-10-CM

## 2024-06-10 DIAGNOSIS — J45.40 MODERATE PERSISTENT ASTHMA WITHOUT COMPLICATION: ICD-10-CM

## 2024-06-10 PROCEDURE — 3080F DIAST BP >= 90 MM HG: CPT | Performed by: FAMILY MEDICINE

## 2024-06-10 PROCEDURE — 1160F RVW MEDS BY RX/DR IN RCRD: CPT | Performed by: FAMILY MEDICINE

## 2024-06-10 PROCEDURE — 1159F MED LIST DOCD IN RCRD: CPT | Performed by: FAMILY MEDICINE

## 2024-06-10 PROCEDURE — 3077F SYST BP >= 140 MM HG: CPT | Performed by: FAMILY MEDICINE

## 2024-06-10 PROCEDURE — 99214 OFFICE O/P EST MOD 30 MIN: CPT | Performed by: FAMILY MEDICINE

## 2024-06-10 RX ORDER — HYDROCHLOROTHIAZIDE 12.5 MG/1
12.5 TABLET ORAL DAILY
Qty: 30 TABLET | Refills: 1 | Status: SHIPPED | OUTPATIENT
Start: 2024-06-10

## 2024-06-10 RX ORDER — FLUTICASONE PROPIONATE AND SALMETEROL XINAFOATE 115; 21 UG/1; UG/1
2 AEROSOL, METERED RESPIRATORY (INHALATION)
Qty: 12 G | Refills: 3 | Status: SHIPPED | OUTPATIENT
Start: 2024-06-10

## 2024-06-10 NOTE — PROGRESS NOTES
Chief Complaint  Hypertension and Back Pain    Subjective          Gabriele Chau presents to [facility]  History of Present Illness  Pt has low back pain that is now controlled with meds- pt does not want any PT at this time    Pt says albuterol helps with breathing- uses it 2-3 times daily- will start advair- pt told to rinse mouth with water and swallow it each time he uses advair in order to prevent thrush  Hypertension  This is a chronic problem. The current episode started more than 1 year ago. The problem has been improved since onset. The problem is uncontrolled. Pertinent negatives include no anxiety, blurred vision, chest pain, headaches, malaise/fatigue, neck pain, orthopnea, palpitations, peripheral edema, PND, shortness of breath or sweats. There are no associated agents to hypertension. Risk factors for coronary artery disease include dyslipidemia, family history and male gender. Current antihypertension treatment includes angiotensin blockers. The current treatment provides moderate improvement. There are no compliance problems.        BMI is within normal parameters. No other follow-up for BMI required.       Objective   Allergies   Allergen Reactions    Atorvastatin Diarrhea     Other reaction(s): Loose stools      Xarelto [Rivaroxaban] Hives     Immunization History   Administered Date(s) Administered    COVID-19 (PFIZER) Purple Cap Monovalent 07/01/2021, 07/22/2021    Influenza, Unspecified 01/23/2020    Tdap 05/28/2024     Past Medical History:   Diagnosis Date    CAD 8/19/2021    High blood pressure     Hyperlipidemia     PVD (peripheral vascular disease)       Past Surgical History:   Procedure Laterality Date    EYE SURGERY      ROTATOR CUFF REPAIR        Social History     Socioeconomic History    Marital status:    Tobacco Use    Smoking status: Former     Current packs/day: 0.00     Average packs/day: 1 pack/day for 1 year (1.0 ttl pk-yrs)      Types: Cigarettes     Start date:      Quit date:      Years since quittin.4    Smokeless tobacco: Never   Vaping Use    Vaping status: Never Used   Substance and Sexual Activity    Alcohol use: Never    Drug use: Never    Sexual activity: Defer        Current Outpatient Medications:     albuterol sulfate  (90 Base) MCG/ACT inhaler, Inhale 2 puffs Every 4 (Four) Hours As Needed for Wheezing., Disp: 18 g, Rfl: 1    ASPIRIN 81 PO, Take 81 mg by mouth Daily., Disp: , Rfl:     Elastic Bandages & Supports (Wrist/Thumb Splint/Right XL) misc, Use 1 each Daily., Disp: 1 each, Rfl: 0    fluticasone-salmeterol (Advair HFA) 115-21 MCG/ACT inhaler, Inhale 2 puffs 2 (Two) Times a Day., Disp: 12 g, Rfl: 3    hydroCHLOROthiazide 12.5 MG tablet, Take 1 tablet by mouth Daily., Disp: 30 tablet, Rfl: 1    lidocaine (Lidoderm) 5 %, Place 2 patches on the skin as directed by provider Daily. Remove & Discard patch within 12 hours or as directed by MD, Disp: 60 patch, Rfl: 1    losartan (COZAAR) 100 MG tablet, TAKE 1 TABLET BY MOUTH DAILY, Disp: 90 tablet, Rfl: 0    lovastatin (MEVACOR) 10 MG tablet, Take 1 tablet by mouth Every Night., Disp: 90 tablet, Rfl: 3    meloxicam (MOBIC) 15 MG tablet, TAKE 1 TABLET BY MOUTH DAILY, Disp: 10 tablet, Rfl: 0    sertraline (Zoloft) 50 MG tablet, Take 1 tablet by mouth Daily., Disp: 30 tablet, Rfl: 1   Family History   Problem Relation Age of Onset    Heart attack Father     Hypertension Father     Hypertension Paternal Uncle           Vital Signs:   Vitals:    06/10/24 1124   BP: 148/90   Pulse: 55   Temp: 97.4 °F (36.3 °C)   SpO2: 99%   Weight: 62.6 kg (137 lb 14.4 oz)       Review of Systems   Constitutional:  Negative for fatigue, fever and malaise/fatigue.   HENT:  Negative for sore throat.    Eyes:  Negative for blurred vision and visual disturbance.   Respiratory:  Negative for cough, chest tightness, shortness of breath and wheezing.    Cardiovascular:  Negative for chest  pain, palpitations, orthopnea, leg swelling and PND.   Gastrointestinal:  Negative for abdominal pain, diarrhea, nausea and vomiting.   Musculoskeletal:  Negative for neck pain.   Skin:  Negative for rash.   Neurological:  Negative for dizziness, light-headedness and headaches.      Physical Exam  Vitals reviewed.   Constitutional:       Appearance: Normal appearance. He is well-developed.   HENT:      Head: Normocephalic and atraumatic.      Right Ear: External ear normal.      Left Ear: External ear normal.      Mouth/Throat:      Pharynx: No oropharyngeal exudate.   Eyes:      Conjunctiva/sclera: Conjunctivae normal.      Pupils: Pupils are equal, round, and reactive to light.   Cardiovascular:      Rate and Rhythm: Normal rate and regular rhythm.      Pulses: Normal pulses.      Heart sounds: Normal heart sounds. No murmur heard.     No friction rub. No gallop.   Pulmonary:      Effort: Pulmonary effort is normal.      Breath sounds: Normal breath sounds. No wheezing or rhonchi.   Abdominal:      General: Abdomen is flat. Bowel sounds are normal. There is no distension.      Palpations: Abdomen is soft. There is no mass.      Tenderness: There is no abdominal tenderness. There is no guarding or rebound.      Hernia: No hernia is present.   Musculoskeletal:         General: Normal range of motion.   Skin:     General: Skin is warm and dry.      Capillary Refill: Capillary refill takes less than 2 seconds.   Neurological:      General: No focal deficit present.      Mental Status: He is alert and oriented to person, place, and time.      Cranial Nerves: No cranial nerve deficit.   Psychiatric:         Mood and Affect: Mood and affect normal.         Behavior: Behavior normal.         Thought Content: Thought content normal.         Judgment: Judgment normal.        Result Review :   The following data was reviewed by: Carlo Hernandez MD on 06/10/2024:  [facility]          12/15/2023    09:18 5/28/2024    11:03 5/28/2024     11:20   CMP   Glucose  87     BUN  19     Creatinine  1.29     EGFR  63.5     Sodium  140     Potassium  4.8     Chloride  101     Calcium  10.0     Total Protein 6.4  7.6  7.4    Albumin 3.8  4.5  4.4    Globulin  3.1     Total Bilirubin 0.3  0.4  0.4    Alkaline Phosphatase 45  68  70    AST (SGOT) 18  13  16    ALT (SGPT) 13  12  11    Albumin/Globulin Ratio  1.5     BUN/Creatinine Ratio  14.7     Anion Gap  9.0       CBC          5/28/2024    11:03   CBC   WBC 10.27    RBC 4.49    Hemoglobin 14.2    Hematocrit 43.1    MCV 96.0    MCH 31.6    MCHC 32.9    RDW 12.5    Platelets 260      Lipid Panel          12/15/2023    09:18 5/28/2024    11:20   Lipid Panel   Total Cholesterol 135  155    Triglycerides 53  132    HDL Cholesterol 40  40    VLDL Cholesterol 12  24    LDL Cholesterol  83  91    LDL/HDL Ratio 2.11  2.22        PSA          5/28/2024    11:03   PSA   PSA 1.650                Assessment and Plan    Diagnoses and all orders for this visit:    1. Primary hypertension (Primary)  -     hydroCHLOROthiazide 12.5 MG tablet; Take 1 tablet by mouth Daily.  Dispense: 30 tablet; Refill: 1    2. Moderate persistent asthma without complication  -     fluticasone-salmeterol (Advair HFA) 115-21 MCG/ACT inhaler; Inhale 2 puffs 2 (Two) Times a Day.  Dispense: 12 g; Refill: 3            Follow Up   Return in about 6 months (around 12/10/2024) for Recheck.  Patient was given instructions and counseling regarding his condition or for health maintenance advice. Please see specific information pulled into the AVS if appropriate.

## 2024-06-15 DIAGNOSIS — M79.641 RIGHT HAND PAIN: ICD-10-CM

## 2024-06-15 DIAGNOSIS — M54.50 ACUTE RIGHT-SIDED LOW BACK PAIN WITHOUT SCIATICA: ICD-10-CM

## 2024-06-17 RX ORDER — MELOXICAM 15 MG/1
15 TABLET ORAL DAILY
Qty: 10 TABLET | Refills: 0 | Status: SHIPPED | OUTPATIENT
Start: 2024-06-17

## 2024-06-21 NOTE — PROGRESS NOTES
[facility]  Cardiology progress Note    Patient Name: Gabriele Chau  : 1965    CHIEF COMPLAINT  CAD        Subjective   Subjective     HISTORY OF PRESENT ILLNESS    Gabriele Chau is a 58 y.o. male with CAD status post CABG.  No chest pain or shortness of breath.    REVIEW OF SYSTEMS    Constitutional:    No fever, no weight loss  Skin:     No rash  Otolaryngeal:    No difficulty swallowing  Cardiovascular: See HPI.  Pulmonary:    No cough, no sputum production    Personal History     Social History:    reports that he quit smoking about 17 months ago. His smoking use included cigarettes. He started smoking about 2 years ago. He has a 1 pack-year smoking history. He has never used smokeless tobacco. He reports that he does not drink alcohol and does not use drugs.    Home Medications:  Current Outpatient Medications on File Prior to Visit   Medication Sig    ASPIRIN 81 PO Take 81 mg by mouth Daily.    Elastic Bandages & Supports (Wrist/Thumb Splint/Right XL) misc Use 1 each Daily.    fluticasone-salmeterol (Advair HFA) 115-21 MCG/ACT inhaler Inhale 2 puffs 2 (Two) Times a Day.    lidocaine (Lidoderm) 5 % Place 2 patches on the skin as directed by provider Daily. Remove & Discard patch within 12 hours or as directed by MD    losartan (COZAAR) 100 MG tablet TAKE 1 TABLET BY MOUTH DAILY    lovastatin (MEVACOR) 10 MG tablet Take 1 tablet by mouth Every Night.    meloxicam (MOBIC) 15 MG tablet TAKE 1 TABLET BY MOUTH DAILY    sertraline (Zoloft) 50 MG tablet Take 1 tablet by mouth Daily.    [DISCONTINUED] albuterol sulfate  (90 Base) MCG/ACT inhaler Inhale 2 puffs Every 4 (Four) Hours As Needed for Wheezing.    [DISCONTINUED] hydroCHLOROthiazide 12.5 MG tablet Take 1 tablet by mouth Daily. (Patient not taking: Reported on 2024)     No current facility-administered medications on file prior to visit.       Past Medical History:   Diagnosis Date    CAD 2021    High blood  pressure     Hyperlipidemia     PVD (peripheral vascular disease)        Allergies:  Allergies   Allergen Reactions    Atorvastatin Diarrhea     Other reaction(s): Loose stools      Xarelto [Rivaroxaban] Hives       Objective    Objective       Vitals:   Heart Rate:  [52] 52  BP: (122)/(72) 122/72  Body mass index is 18.44 kg/m².     PHYSICAL EXAM:    General Appearance:   well developed  well nourished  HENT:   oropharynx moist  lips not cyanotic  Neck:  thyroid not enlarged  supple  Respiratory:  no respiratory distress  normal breath sounds  no rales  Cardiovascular:  no jugular venous distention  regular rhythm  apical impulse normal  S1 normal, S2 normal  no S3, no S4   no murmur  no rub, no thrill  carotid pulses normal; no bruit  pedal pulses normal  lower extremity edema: none    Skin:   warm, dry  Psychiatric:  judgement and insight appropriate  normal mood and affect        Result Review:  I have personally reviewed the available results from  [x]  Laboratory  [x]  EKG  [x]  Cardiology  [x]  Medications  [x]  Old records  []  Other:     Procedures  Lab Results   Component Value Date    CHOL 155 05/28/2024    CHOL 135 12/15/2023    CHOL 104 10/04/2021     Lab Results   Component Value Date    TRIG 132 05/28/2024    TRIG 53 12/15/2023    TRIG 47 10/04/2021     Lab Results   Component Value Date    HDL 40 05/28/2024    HDL 40 12/15/2023    HDL 49 10/04/2021     Lab Results   Component Value Date    LDL 91 05/28/2024    LDL 83 12/15/2023    LDL 43 10/04/2021     Lab Results   Component Value Date    VLDL 24 05/28/2024    VLDL 12 12/15/2023    VLDL 12 10/04/2021     Results for orders placed in visit on 10/20/21    Adult Transthoracic Echo Complete W/ Cont if Necessary Per Protocol    Interpretation Summary  1.  Normal left ventricular systolic function.  2.  No significant valve abnormalities noted.     Impression/Plan:  1.  Mixed hyperlipidemia: Continue Mevacor 10 mg once a day.  Monitor lipid and hepatic  profile.  2.  Essential hypertension controlled: Continue Cozaar 100 mg once a day.  Monitor blood pressure regularly.  3.  Coronary artery status post CABG stable: Continue Plavix 75 mg once a day.  No chest pain.           Paxton Reeves MD   06/25/24   14:09 EDT

## 2024-06-25 ENCOUNTER — OFFICE VISIT (OUTPATIENT)
Dept: CARDIOLOGY | Facility: CLINIC | Age: 59
End: 2024-06-25
Payer: COMMERCIAL

## 2024-06-25 VITALS
BODY MASS INDEX: 18.42 KG/M2 | HEIGHT: 72 IN | HEART RATE: 52 BPM | WEIGHT: 136 LBS | SYSTOLIC BLOOD PRESSURE: 122 MMHG | DIASTOLIC BLOOD PRESSURE: 72 MMHG

## 2024-06-25 DIAGNOSIS — R06.02 SHORTNESS OF BREATH: ICD-10-CM

## 2024-06-25 DIAGNOSIS — I25.10 CORONARY ARTERY DISEASE INVOLVING NATIVE CORONARY ARTERY OF NATIVE HEART WITHOUT ANGINA PECTORIS: ICD-10-CM

## 2024-06-25 DIAGNOSIS — I10 HYPERTENSION, ESSENTIAL: Primary | ICD-10-CM

## 2024-06-25 DIAGNOSIS — Z95.1 HX OF CABG: ICD-10-CM

## 2024-06-25 DIAGNOSIS — E78.2 HYPERLIPEMIA, MIXED: ICD-10-CM

## 2024-06-25 PROCEDURE — 1159F MED LIST DOCD IN RCRD: CPT | Performed by: SPECIALIST

## 2024-06-25 PROCEDURE — 3078F DIAST BP <80 MM HG: CPT | Performed by: SPECIALIST

## 2024-06-25 PROCEDURE — 99214 OFFICE O/P EST MOD 30 MIN: CPT | Performed by: SPECIALIST

## 2024-06-25 PROCEDURE — 3074F SYST BP LT 130 MM HG: CPT | Performed by: SPECIALIST

## 2024-06-25 PROCEDURE — 1160F RVW MEDS BY RX/DR IN RCRD: CPT | Performed by: SPECIALIST

## 2024-06-25 RX ORDER — ALBUTEROL SULFATE 90 UG/1
2 AEROSOL, METERED RESPIRATORY (INHALATION) EVERY 4 HOURS PRN
Qty: 18 G | Refills: 1 | Status: SHIPPED | OUTPATIENT
Start: 2024-06-25

## 2024-07-12 ENCOUNTER — HOSPITAL ENCOUNTER (OUTPATIENT)
Dept: CT IMAGING | Facility: HOSPITAL | Age: 59
Discharge: HOME | End: 2024-07-12
Payer: COMMERCIAL

## 2024-07-12 ENCOUNTER — HOSPITAL ENCOUNTER (OUTPATIENT)
Dept: RESPIRATORY THERAPY | Facility: HOSPITAL | Age: 59
Discharge: HOME | End: 2024-07-12
Payer: COMMERCIAL

## 2024-07-12 DIAGNOSIS — R06.02 SHORTNESS OF BREATH: ICD-10-CM

## 2024-07-12 DIAGNOSIS — Z12.2 SCREENING FOR LUNG CANCER: ICD-10-CM

## 2024-07-12 PROCEDURE — 94726 PLETHYSMOGRAPHY LUNG VOLUMES: CPT

## 2024-07-12 PROCEDURE — 94060 EVALUATION OF WHEEZING: CPT

## 2024-07-12 PROCEDURE — 94729 DIFFUSING CAPACITY: CPT

## 2024-07-12 PROCEDURE — 71271 CT THORAX LUNG CANCER SCR C-: CPT

## 2024-07-12 RX ORDER — ALBUTEROL SULFATE 2.5 MG/3ML
2.5 SOLUTION RESPIRATORY (INHALATION) ONCE
Status: COMPLETED | OUTPATIENT
Start: 2024-07-12 | End: 2024-07-12

## 2024-07-12 RX ADMIN — ALBUTEROL SULFATE 2.5 MG: 2.5 SOLUTION RESPIRATORY (INHALATION) at 13:56

## 2024-07-22 ENCOUNTER — TELEPHONE (OUTPATIENT)
Dept: FAMILY MEDICINE CLINIC | Facility: CLINIC | Age: 59
End: 2024-07-22

## 2024-07-22 NOTE — TELEPHONE ENCOUNTER
Caller: Gabriele Chau    Relationship: Self    Best call back number:     [phone]      Caller requesting test results: PATIENT    What test was performed: BREATHING TEST    When was the test performed: 7.12.2024    Where was the test performed:     Additional notes: PLEASE ADVISE THE RESULTS FROM BREATHING TEST.

## 2024-07-23 PROBLEM — J41.0 SIMPLE CHRONIC BRONCHITIS: Status: ACTIVE | Noted: 2024-07-23

## 2024-07-27 DIAGNOSIS — R06.02 SHORTNESS OF BREATH: ICD-10-CM

## 2024-07-29 RX ORDER — ALBUTEROL SULFATE 90 UG/1
2 AEROSOL, METERED RESPIRATORY (INHALATION) EVERY 4 HOURS PRN
Qty: 18 G | Refills: 1 | Status: SHIPPED | OUTPATIENT
Start: 2024-07-29

## 2024-12-11 ENCOUNTER — TELEPHONE (OUTPATIENT)
Dept: CARDIOLOGY | Facility: CLINIC | Age: 59
End: 2024-12-11
Payer: COMMERCIAL

## 2024-12-11 RX ORDER — LOSARTAN POTASSIUM 100 MG/1
100 TABLET ORAL DAILY
Qty: 90 TABLET | Refills: 0 | Status: SHIPPED | OUTPATIENT
Start: 2024-12-11

## 2025-01-23 NOTE — PROGRESS NOTES
[facility]  Cardiology progress Note    Patient Name: Gabriele Chau  : 1965    CHIEF COMPLAINT  CAD        Subjective   Subjective     HISTORY OF PRESENT ILLNESS    Gabriele Chau is a 59 y.o. male with CAD status post CABG.  No chest pain    REVIEW OF SYSTEMS    Constitutional:    No fever, no weight loss  Skin:     No rash  Otolaryngeal:    No difficulty swallowing  Cardiovascular: See HPI.  Pulmonary:    No cough, no sputum production    Personal History     Social History:    reports that he quit smoking about 2 years ago. His smoking use included cigarettes. He started smoking about 3 years ago. He has a 1 pack-year smoking history. He has never used smokeless tobacco. He reports that he does not drink alcohol and does not use drugs.    Home Medications:  Current Outpatient Medications on File Prior to Visit   Medication Sig    ASPIRIN 81 PO Take 81 mg by mouth Daily.    dorzolamide-timolol (COSOPT) 2-0.5 % ophthalmic solution     Elastic Bandages & Supports (Wrist/Thumb Splint/Right XL) misc Use 1 each Daily.    losartan (COZAAR) 100 MG tablet Take 1 tablet by mouth Daily.    lovastatin (MEVACOR) 10 MG tablet Take 1 tablet by mouth Every Night.    Rhopressa 0.02 % solution INSTILL 1 DROP IN LEFT EYE EVERY NIGHT AT BEDTIME    [DISCONTINUED] fluticasone-salmeterol (Advair HFA) 115-21 MCG/ACT inhaler Inhale 2 puffs 2 (Two) Times a Day. (Patient not taking: Reported on 2025)    [DISCONTINUED] lidocaine (Lidoderm) 5 % Place 2 patches on the skin as directed by provider Daily. Remove & Discard patch within 12 hours or as directed by MD (Patient not taking: Reported on 2025)    [DISCONTINUED] meloxicam (MOBIC) 15 MG tablet TAKE 1 TABLET BY MOUTH DAILY (Patient not taking: Reported on 2025)    [DISCONTINUED] sertraline (Zoloft) 50 MG tablet Take 1 tablet by mouth Daily. (Patient not taking: Reported on 2025)    [DISCONTINUED] Ventolin  (90 Base) MCG/ACT  inhaler INHALE TWO PUFFS BY MOUTH EVERY 4 HOURS AS NEEDED FOR WHEEZING (Patient not taking: Reported on 1/28/2025)     No current facility-administered medications on file prior to visit.       Past Medical History:   Diagnosis Date    CAD 8/19/2021    High blood pressure     Hyperlipidemia     PVD (peripheral vascular disease)        Allergies:  Allergies   Allergen Reactions    Atorvastatin Diarrhea     Other reaction(s): Loose stools      Xarelto [Rivaroxaban] Hives       Objective    Objective       Vitals:   Heart Rate:  [56] 56  BP: (136)/(70) 136/70  Body mass index is 18.44 kg/m².     PHYSICAL EXAM:    General Appearance:   well developed  well nourished  HENT:   oropharynx moist  lips not cyanotic  Neck:  thyroid not enlarged  supple  Respiratory:  no respiratory distress  normal breath sounds  no rales  Cardiovascular:  no jugular venous distention  regular rhythm  apical impulse normal  S1 normal, S2 normal  no S3, no S4   no murmur  no rub, no thrill  carotid pulses normal; no bruit  pedal pulses normal  lower extremity edema: none    Skin:   warm, dry  Psychiatric:  judgement and insight appropriate  normal mood and affect        Result Review:  I have personally reviewed the available results from  [x]  Laboratory  [x]  EKG  [x]  Cardiology  [x]  Medications  [x]  Old records  []  Other:     Procedures  Lab Results   Component Value Date    CHOL 155 05/28/2024    CHOL 135 12/15/2023    CHOL 104 10/04/2021     Lab Results   Component Value Date    TRIG 132 05/28/2024    TRIG 53 12/15/2023    TRIG 47 10/04/2021     Lab Results   Component Value Date    HDL 40 05/28/2024    HDL 40 12/15/2023    HDL 49 10/04/2021     Lab Results   Component Value Date    LDL 91 05/28/2024    LDL 83 12/15/2023    LDL 43 10/04/2021     Lab Results   Component Value Date    VLDL 24 05/28/2024    VLDL 12 12/15/2023    VLDL 12 10/04/2021     Results for orders placed in visit on 10/20/21    Adult Transthoracic Echo Complete W/  Cont if Necessary Per Protocol    Interpretation Summary  1.  Normal left ventricular systolic function.  2.  No significant valve abnormalities noted.     Impression/Plan:  1.  Mixed hyperlipidemia: Continue Mevacor 10 mg once a day.  Monitor lipid and hepatic profile.  2.  Essential hypertension controlled: Continue Cozaar 100 mg once a day.  Monitor blood pressure regularly.  3.  Coronary artery status post CABG stable: Continue Plavix 75 mg once a day.  No chest pain.           Paxton Reeves MD   01/28/25   12:58 EST

## 2025-01-28 ENCOUNTER — OFFICE VISIT (OUTPATIENT)
Dept: CARDIOLOGY | Facility: CLINIC | Age: 60
End: 2025-01-28
Payer: COMMERCIAL

## 2025-01-28 VITALS
BODY MASS INDEX: 18.42 KG/M2 | DIASTOLIC BLOOD PRESSURE: 70 MMHG | HEART RATE: 56 BPM | WEIGHT: 136 LBS | SYSTOLIC BLOOD PRESSURE: 136 MMHG | HEIGHT: 72 IN | OXYGEN SATURATION: 100 %

## 2025-01-28 DIAGNOSIS — I25.10 CORONARY ARTERY DISEASE INVOLVING NATIVE CORONARY ARTERY OF NATIVE HEART WITHOUT ANGINA PECTORIS: ICD-10-CM

## 2025-01-28 DIAGNOSIS — I10 HYPERTENSION, ESSENTIAL: Primary | ICD-10-CM

## 2025-01-28 DIAGNOSIS — E78.2 HYPERLIPEMIA, MIXED: ICD-10-CM

## 2025-01-28 PROCEDURE — 3078F DIAST BP <80 MM HG: CPT | Performed by: SPECIALIST

## 2025-01-28 PROCEDURE — 99214 OFFICE O/P EST MOD 30 MIN: CPT | Performed by: SPECIALIST

## 2025-01-28 PROCEDURE — 3075F SYST BP GE 130 - 139MM HG: CPT | Performed by: SPECIALIST

## 2025-01-28 RX ORDER — DORZOLAMIDE HYDROCHLORIDE AND TIMOLOL MALEATE 20; 5 MG/ML; MG/ML
SOLUTION/ DROPS OPHTHALMIC
COMMUNITY
Start: 2025-01-23

## 2025-01-28 RX ORDER — NETARSUDIL 0.2 MG/ML
SOLUTION/ DROPS OPHTHALMIC; TOPICAL
COMMUNITY
Start: 2025-01-14

## 2025-03-20 RX ORDER — LOSARTAN POTASSIUM 100 MG/1
100 TABLET ORAL DAILY
Qty: 90 TABLET | Refills: 0 | Status: SHIPPED | OUTPATIENT
Start: 2025-03-20

## 2025-03-20 NOTE — TELEPHONE ENCOUNTER
Rx Refill Note  Requested Prescriptions     Pending Prescriptions Disp Refills    losartan (COZAAR) 100 MG tablet [Pharmacy Med Name: LOSARTAN 100MG TABLETS] 90 tablet 0     Sig: TAKE 1 TABLET BY MOUTH DAILY        LAST OFFICE VISIT:  1/28/2025     NEXT OFFICE VISIT:  7/1/2025     Does the medication requests match the last office note:    [x] Yes   [] No    Does this refill request meet protocol details for MA to approve:     [x] Yes   [] No

## 2025-03-28 ENCOUNTER — HOSPITAL ENCOUNTER (OUTPATIENT)
Dept: GENERAL RADIOLOGY | Facility: HOSPITAL | Age: 60
Discharge: HOME | End: 2025-03-28
Payer: COMMERCIAL

## 2025-03-28 ENCOUNTER — OFFICE VISIT (OUTPATIENT)
Dept: FAMILY MEDICINE CLINIC | Facility: CLINIC | Age: 60
End: 2025-03-28
Payer: COMMERCIAL

## 2025-03-28 VITALS
OXYGEN SATURATION: 100 % | DIASTOLIC BLOOD PRESSURE: 94 MMHG | BODY MASS INDEX: 17.82 KG/M2 | HEIGHT: 72 IN | TEMPERATURE: 98 F | SYSTOLIC BLOOD PRESSURE: 170 MMHG | HEART RATE: 76 BPM | WEIGHT: 131.6 LBS

## 2025-03-28 DIAGNOSIS — M54.9 ACUTE LEFT-SIDED BACK PAIN, UNSPECIFIED BACK LOCATION: ICD-10-CM

## 2025-03-28 DIAGNOSIS — S19.80XA BLUNT TRAUMA OF NECK, INITIAL ENCOUNTER: Primary | ICD-10-CM

## 2025-03-28 DIAGNOSIS — M25.512 ACUTE PAIN OF LEFT SHOULDER: ICD-10-CM

## 2025-03-28 DIAGNOSIS — M25.551 RIGHT HIP PAIN: ICD-10-CM

## 2025-03-28 PROCEDURE — 72100 X-RAY EXAM L-S SPINE 2/3 VWS: CPT

## 2025-03-28 PROCEDURE — 73030 X-RAY EXAM OF SHOULDER: CPT

## 2025-03-28 PROCEDURE — 73502 X-RAY EXAM HIP UNI 2-3 VIEWS: CPT

## 2025-03-28 PROCEDURE — 72072 X-RAY EXAM THORAC SPINE 3VWS: CPT

## 2025-03-28 PROCEDURE — 72040 X-RAY EXAM NECK SPINE 2-3 VW: CPT

## 2025-03-28 RX ORDER — HYDROCODONE BITARTRATE AND ACETAMINOPHEN 5; 325 MG/1; MG/1
1 TABLET ORAL EVERY 6 HOURS PRN
Qty: 20 TABLET | Refills: 0 | Status: SHIPPED | OUTPATIENT
Start: 2025-03-28 | End: 2025-04-02

## 2025-03-28 NOTE — PROGRESS NOTES
"Chief Complaint  Wound Check (Pt has head injury, neck injury, Lt elbow pain, back pain after being attacked by goat last night. Pt is UTD on Tdap vaccine.)    Subjective      Gabriele Chau is a 59 y.o. male who presents to [facility]     Patient was attacked by a goat last night (neighbor's goat) and has several injuries from that.  He is up-to-date on Tdap vaccine. Goat hit him in the right hip, the back, the left elbow, and the head. He has a wound on the top of his head from this.Has neck pain and left upper side of chest as well. Breathing okay but he's in pain. No numbness or tingling, no weakness.     The goat did not bite him.  He was not attacked by any other animals.  His neighbors have a hole in their fence and his dog rushed through it and so the patient went to get his dog and that is when the goat attacked him.    Objective   Vital Signs:   Vitals:    03/28/25 0950   BP: 170/94   BP Location: Right arm   Patient Position: Sitting   Cuff Size: Adult   Pulse: 76   Temp: 98 °F (36.7 °C)   TempSrc: Temporal   SpO2: 100%   Weight: 59.7 kg (131 lb 9.6 oz)   Height: 182.9 cm (72\")   PainSc: 9    PainLoc: Neck     Body mass index is 17.85 kg/m².    Wt Readings from Last 3 Encounters:   03/28/25 59.7 kg (131 lb 9.6 oz)   01/28/25 61.7 kg (136 lb)   06/25/24 61.7 kg (136 lb)     BP Readings from Last 3 Encounters:   03/28/25 170/94   01/28/25 136/70   06/25/24 122/72       Health Maintenance   Topic Date Due    HEPATITIS C SCREENING  Never done    COLORECTAL CANCER SCREENING  07/14/2025    COVID-19 Vaccine (3 - 2024-25 season) 09/24/2025 (Originally 9/1/2024)    INFLUENZA VACCINE  09/24/2025 (Originally 7/1/2024)    Pneumococcal Vaccine 50+ (1 of 2 - PCV) 09/24/2025 (Originally 12/3/1982)    ZOSTER VACCINE (1 of 2) 03/27/2026 (Originally 12/3/2013)    ANNUAL PHYSICAL  05/28/2025    LIPID PANEL  05/28/2025    TDAP/TD VACCINES (2 - Td or Tdap) 05/28/2034       Physical " Exam  Vitals and nursing note reviewed.   Constitutional:       General: He is not in acute distress.  HENT:      Head:      Comments: Scrapes with dried blood on his scalp.  This was cleaned off today in clinic with sterile water and then some gauze was applied as a bandage.  Eyes:      Comments: Chronic redness of left eye   Cardiovascular:      Rate and Rhythm: Normal rate and regular rhythm.      Heart sounds: Normal heart sounds.   Pulmonary:      Effort: Pulmonary effort is normal. No respiratory distress.      Breath sounds: Normal breath sounds.   Musculoskeletal:      Comments: Pretty diffusely tender across his areas of pain.  Tender across the whole back primarily on the left side.  Tender around the neck.  Left shoulder is tender.  Right hip is tender.  He is ambulating independently but walks slowly due to the pain.  Range of motion of back, neck, left shoulder, right hip all limited secondary to the pain.   Neurological:      Mental Status: He is alert.   Psychiatric:         Mood and Affect: Mood normal.         Behavior: Behavior normal.          Result Review :  The following data was reviewed by: Blake Allen MD on 03/28/2025:         Procedures          Assessment & Plan  Blunt trauma of neck, initial encounter  Multiple x-rays ordered due to traumatic goat attack.  Imaging whole back as well as left shoulder and right hip.  Recommended Tylenol, ice, ibuprofen for pain control.  I discussed if he has any worsening, any numbness or tingling, any weakness develops, then he is instructed to go to the ER immediately.    I also sent him in a short course of Norco 5s for 5 days due to his inflammation and severe pain.  I discussed that this was not a long-term medication and that opioids such as Norco are high risk and can cause constipation.  I cautioned him not to take it any more than prescribed.  He verbalized understanding with this, he normally does not like to take pills, and he wants to  try to get off them as soon as possible.  Orders:    HYDROcodone-acetaminophen (NORCO) 5-325 MG per tablet; Take 1 tablet by mouth Every 6 (Six) Hours As Needed for Moderate Pain for up to 5 days.    Acute pain of left shoulder    Orders:    XR Shoulder 2+ View Left    HYDROcodone-acetaminophen (NORCO) 5-325 MG per tablet; Take 1 tablet by mouth Every 6 (Six) Hours As Needed for Moderate Pain for up to 5 days.    Acute left-sided back pain, unspecified back location    Orders:    XR Spine Lumbar 2 or 3 View (In Office)    XR Spine Cervical 2 or 3 View    XR Spine Thoracic 3 View    HYDROcodone-acetaminophen (NORCO) 5-325 MG per tablet; Take 1 tablet by mouth Every 6 (Six) Hours As Needed for Moderate Pain for up to 5 days.    Right hip pain    Orders:    XR Hip With or Without Pelvis 2 - 3 View Right    HYDROcodone-acetaminophen (NORCO) 5-325 MG per tablet; Take 1 tablet by mouth Every 6 (Six) Hours As Needed for Moderate Pain for up to 5 days.             FOLLOW UP  Return if symptoms worsen or fail to improve.  Patient was given instructions and counseling regarding his condition or for health maintenance advice. Please see specific information pulled into the AVS if appropriate.       Blake Allen MD  03/28/25  11:04 EDT    CURRENT & DISCONTINUED MEDICATIONS  Current Outpatient Medications   Medication Instructions    ASPIRIN 81 PO 81 mg, Daily    dorzolamide-timolol (COSOPT) 2-0.5 % ophthalmic solution     Elastic Bandages & Supports (Wrist/Thumb Splint/Right XL) misc 1 each, Not Applicable, Daily    HYDROcodone-acetaminophen (NORCO) 5-325 MG per tablet 1 tablet, Oral, Every 6 Hours PRN    losartan (COZAAR) 100 mg, Oral, Daily    lovastatin (MEVACOR) 10 mg, Oral, Nightly    Rhopressa 0.02 % solution INSTILL 1 DROP IN LEFT EYE EVERY NIGHT AT BEDTIME       There are no discontinued medications.

## 2025-06-28 NOTE — PROGRESS NOTES
[facility]  Cardiology progress Note    Patient Name: Gabriele Chau  : 1965    CHIEF COMPLAINT  CAD        Subjective   Subjective     HISTORY OF PRESENT ILLNESS    Gabriele Chau is a 59 y.o. male with CAD.  No chest pain.    REVIEW OF SYSTEMS    Constitutional:    No fever, no weight loss  Skin:     No rash  Otolaryngeal:    No difficulty swallowing  Cardiovascular: See HPI.  Pulmonary:    No cough, no sputum production    Personal History     Social History:    reports that he quit smoking about 2 years ago. His smoking use included cigarettes. He started smoking about 3 years ago. He has a 1 pack-year smoking history. He has never used smokeless tobacco. He reports that he does not drink alcohol and does not use drugs.    Home Medications:  Current Outpatient Medications on File Prior to Visit   Medication Sig    ASPIRIN 81 PO Take 81 mg by mouth Daily.    dorzolamide-timolol (COSOPT) 2-0.5 % ophthalmic solution     Elastic Bandages & Supports (Wrist/Thumb Splint/Right XL) misc Use 1 each Daily.    losartan (COZAAR) 100 MG tablet TAKE 1 TABLET BY MOUTH DAILY    lovastatin (MEVACOR) 10 MG tablet Take 1 tablet by mouth Every Night.    Rhopressa 0.02 % solution INSTILL 1 DROP IN LEFT EYE EVERY NIGHT AT BEDTIME     No current facility-administered medications on file prior to visit.       Past Medical History:   Diagnosis Date    CAD 2021    High blood pressure     Hyperlipidemia     PVD (peripheral vascular disease)        Allergies:  Allergies   Allergen Reactions    Atorvastatin Diarrhea     Other reaction(s): Loose stools      Xarelto [Rivaroxaban] Hives       Objective    Objective       Vitals:   Heart Rate:  [50] 50  BP: (131)/(77) 131/77  Body mass index is 17.65 kg/m².     PHYSICAL EXAM:    General Appearance:   well developed  well nourished  HENT:   oropharynx moist  lips not cyanotic  Neck:  thyroid not enlarged  supple  Respiratory:  no respiratory distress  normal  breath sounds  no rales  Cardiovascular:  no jugular venous distention  regular rhythm  apical impulse normal  S1 normal, S2 normal  no S3, no S4   no murmur  no rub, no thrill  carotid pulses normal; no bruit  pedal pulses normal  lower extremity edema: none    Skin:   warm, dry  Psychiatric:  judgement and insight appropriate  normal mood and affect        Result Review:  I have personally reviewed the available results from  [x]  Laboratory  [x]  EKG  [x]  Cardiology  [x]  Medications  [x]  Old records  []  Other:     Procedures  Lab Results   Component Value Date    CHOL 155 05/28/2024    CHOL 135 12/15/2023    CHOL 104 10/04/2021     Lab Results   Component Value Date    TRIG 132 05/28/2024    TRIG 53 12/15/2023    TRIG 47 10/04/2021     Lab Results   Component Value Date    HDL 40 05/28/2024    HDL 40 12/15/2023    HDL 49 10/04/2021     Lab Results   Component Value Date    LDL 91 05/28/2024    LDL 83 12/15/2023    LDL 43 10/04/2021     Lab Results   Component Value Date    VLDL 24 05/28/2024    VLDL 12 12/15/2023    VLDL 12 10/04/2021     Results for orders placed in visit on 10/20/21    Adult Transthoracic Echo Complete W/ Cont if Necessary Per Protocol 10/20/2021 11:40 AM    Interpretation Summary  1.  Normal left ventricular systolic function.  2.  No significant valve abnormalities noted.     Impression/Plan:  1.  Mixed hyperlipidemia: Continue Mevacor 10 mg once a day.  Monitor lipid and hepatic profile.  2.  Essential hypertension controlled: Continue Cozaar 100 mg once a day.  Monitor blood pressure regularly.  3.  Coronary artery status post CABG stable: Continue aspirin 81 mg once a day..  No chest pain.              Paxton Reeves MD   07/01/25   14:42 EDT

## 2025-07-01 ENCOUNTER — OFFICE VISIT (OUTPATIENT)
Dept: CARDIOLOGY | Facility: CLINIC | Age: 60
End: 2025-07-01
Payer: COMMERCIAL

## 2025-07-01 VITALS
SYSTOLIC BLOOD PRESSURE: 131 MMHG | HEART RATE: 50 BPM | WEIGHT: 130.2 LBS | DIASTOLIC BLOOD PRESSURE: 77 MMHG | BODY MASS INDEX: 17.64 KG/M2 | HEIGHT: 72 IN

## 2025-07-01 DIAGNOSIS — I10 HYPERTENSION, ESSENTIAL: Primary | ICD-10-CM

## 2025-07-01 DIAGNOSIS — Z95.1 HX OF CABG: ICD-10-CM

## 2025-07-01 DIAGNOSIS — I25.10 CORONARY ARTERY DISEASE INVOLVING NATIVE CORONARY ARTERY OF NATIVE HEART WITHOUT ANGINA PECTORIS: ICD-10-CM

## 2025-07-01 DIAGNOSIS — E78.2 HYPERLIPEMIA, MIXED: ICD-10-CM

## 2025-07-01 PROCEDURE — 99214 OFFICE O/P EST MOD 30 MIN: CPT | Performed by: SPECIALIST

## 2025-07-01 PROCEDURE — 1160F RVW MEDS BY RX/DR IN RCRD: CPT | Performed by: SPECIALIST

## 2025-07-01 PROCEDURE — 1159F MED LIST DOCD IN RCRD: CPT | Performed by: SPECIALIST

## 2025-07-01 PROCEDURE — 3078F DIAST BP <80 MM HG: CPT | Performed by: SPECIALIST

## 2025-07-01 PROCEDURE — 3075F SYST BP GE 130 - 139MM HG: CPT | Performed by: SPECIALIST

## 2025-07-13 DIAGNOSIS — I10 HYPERTENSION, ESSENTIAL: Primary | ICD-10-CM

## 2025-07-16 NOTE — TELEPHONE ENCOUNTER
Please go ahead and send in 90-day prescription, but please place order for BMP and asked patient to have drawn in the next 1-2 weeks.

## 2025-07-17 RX ORDER — LOSARTAN POTASSIUM 100 MG/1
100 TABLET ORAL DAILY
Qty: 90 TABLET | Refills: 0 | Status: SHIPPED | OUTPATIENT
Start: 2025-07-17

## 2025-07-30 ENCOUNTER — CLINICAL SUPPORT (OUTPATIENT)
Dept: FAMILY MEDICINE CLINIC | Facility: CLINIC | Age: 60
End: 2025-07-30
Payer: COMMERCIAL

## 2025-07-30 DIAGNOSIS — I10 HYPERTENSION, ESSENTIAL: ICD-10-CM

## 2025-07-30 LAB
ANION GAP SERPL CALCULATED.3IONS-SCNC: 7 MMOL/L (ref 5–15)
BUN SERPL-MCNC: 15 MG/DL (ref 8–23)
BUN/CREAT SERPL: 12.8 (ref 7–25)
CALCIUM SPEC-SCNC: 9.1 MG/DL (ref 8.6–10.5)
CHLORIDE SERPL-SCNC: 104 MMOL/L (ref 98–107)
CO2 SERPL-SCNC: 28 MMOL/L (ref 22–29)
CREAT SERPL-MCNC: 1.17 MG/DL (ref 0.76–1.27)
EGFRCR SERPLBLD CKD-EPI 2021: 70.9 ML/MIN/1.73
GLUCOSE SERPL-MCNC: 96 MG/DL (ref 65–99)
POTASSIUM SERPL-SCNC: 4.5 MMOL/L (ref 3.5–5.2)
SODIUM SERPL-SCNC: 139 MMOL/L (ref 136–145)

## 2025-07-30 PROCEDURE — 80048 BASIC METABOLIC PNL TOTAL CA: CPT | Performed by: SPECIALIST

## 2025-07-30 PROCEDURE — 36415 COLL VENOUS BLD VENIPUNCTURE: CPT | Performed by: FAMILY MEDICINE

## 2025-07-30 NOTE — PROGRESS NOTES
Venipuncture Blood Specimen Collection  Venipuncture performed in [place] by Tiffany Wheeler MA with good hemostasis. Patient tolerated the procedure well without complications.   07/30/25   Tiffany Wheeler MA